# Patient Record
Sex: MALE | Race: WHITE | NOT HISPANIC OR LATINO
[De-identification: names, ages, dates, MRNs, and addresses within clinical notes are randomized per-mention and may not be internally consistent; named-entity substitution may affect disease eponyms.]

---

## 2017-04-28 PROBLEM — Z00.00 ENCOUNTER FOR PREVENTIVE HEALTH EXAMINATION: Status: ACTIVE | Noted: 2017-04-28

## 2017-05-18 ENCOUNTER — APPOINTMENT (OUTPATIENT)
Dept: PULMONOLOGY | Facility: CLINIC | Age: 82
End: 2017-05-18

## 2017-05-18 VITALS
WEIGHT: 176 LBS | HEART RATE: 79 BPM | BODY MASS INDEX: 26.67 KG/M2 | DIASTOLIC BLOOD PRESSURE: 72 MMHG | OXYGEN SATURATION: 92 % | HEIGHT: 68 IN | SYSTOLIC BLOOD PRESSURE: 132 MMHG

## 2017-05-18 RX ORDER — OLMESARTAN MEDOXOMIL 20 MG/1
20 TABLET, FILM COATED ORAL
Refills: 0 | Status: ACTIVE | COMMUNITY

## 2017-05-18 RX ORDER — METOPROLOL SUCCINATE 50 MG/1
50 TABLET, EXTENDED RELEASE ORAL
Refills: 0 | Status: ACTIVE | COMMUNITY

## 2017-05-18 RX ORDER — FOLIC ACID 1 MG/1
1 TABLET ORAL
Refills: 0 | Status: ACTIVE | COMMUNITY

## 2017-05-18 RX ORDER — AMLODIPINE BESYLATE 10 MG/1
10 TABLET ORAL
Refills: 0 | Status: ACTIVE | COMMUNITY

## 2017-05-18 RX ORDER — GUAIFENESIN 600 MG/1
600 TABLET, EXTENDED RELEASE ORAL
Refills: 0 | Status: ACTIVE | COMMUNITY

## 2017-07-19 ENCOUNTER — APPOINTMENT (OUTPATIENT)
Dept: PULMONOLOGY | Facility: CLINIC | Age: 82
End: 2017-07-19

## 2017-07-19 VITALS
HEART RATE: 75 BPM | DIASTOLIC BLOOD PRESSURE: 60 MMHG | OXYGEN SATURATION: 92 % | BODY MASS INDEX: 25.09 KG/M2 | SYSTOLIC BLOOD PRESSURE: 120 MMHG | WEIGHT: 165 LBS

## 2017-07-19 DIAGNOSIS — J44.9 CHRONIC OBSTRUCTIVE PULMONARY DISEASE, UNSPECIFIED: ICD-10-CM

## 2017-07-19 DIAGNOSIS — R06.09 OTHER FORMS OF DYSPNEA: ICD-10-CM

## 2017-07-26 RX ORDER — FLUTICASONE FUROATE AND VILANTEROL TRIFENATATE 200; 25 UG/1; UG/1
200-25 POWDER RESPIRATORY (INHALATION) DAILY
Qty: 3 | Refills: 1 | Status: ACTIVE | COMMUNITY
Start: 2017-07-26 | End: 1900-01-01

## 2017-07-26 RX ORDER — FLUTICASONE PROPIONATE AND SALMETEROL 50; 500 UG/1; UG/1
500-50 POWDER RESPIRATORY (INHALATION) TWICE DAILY
Qty: 3 | Refills: 1 | Status: DISCONTINUED | COMMUNITY
Start: 2017-07-19 | End: 2017-07-26

## 2017-07-28 ENCOUNTER — APPOINTMENT (OUTPATIENT)
Dept: THORACIC SURGERY | Facility: CLINIC | Age: 82
End: 2017-07-28
Payer: MEDICARE

## 2017-07-28 PROCEDURE — 99205 OFFICE O/P NEW HI 60 MIN: CPT

## 2017-08-01 ENCOUNTER — OUTPATIENT (OUTPATIENT)
Dept: OUTPATIENT SERVICES | Facility: HOSPITAL | Age: 82
LOS: 1 days | End: 2017-08-01
Payer: MEDICARE

## 2017-08-01 VITALS
HEIGHT: 71 IN | HEART RATE: 80 BPM | DIASTOLIC BLOOD PRESSURE: 70 MMHG | SYSTOLIC BLOOD PRESSURE: 120 MMHG | WEIGHT: 160.94 LBS | RESPIRATION RATE: 16 BRPM | TEMPERATURE: 97 F

## 2017-08-01 DIAGNOSIS — I10 ESSENTIAL (PRIMARY) HYPERTENSION: ICD-10-CM

## 2017-08-01 DIAGNOSIS — R91.8 OTHER NONSPECIFIC ABNORMAL FINDING OF LUNG FIELD: ICD-10-CM

## 2017-08-01 DIAGNOSIS — Z01.818 ENCOUNTER FOR OTHER PREPROCEDURAL EXAMINATION: ICD-10-CM

## 2017-08-01 DIAGNOSIS — R22.2 LOCALIZED SWELLING, MASS AND LUMP, TRUNK: ICD-10-CM

## 2017-08-01 LAB
ANION GAP SERPL CALC-SCNC: 13 MMOL/L — SIGNIFICANT CHANGE UP (ref 5–17)
APTT BLD: 30 SEC — SIGNIFICANT CHANGE UP (ref 27.5–37.4)
BASOPHILS # BLD AUTO: 0 K/UL — SIGNIFICANT CHANGE UP (ref 0–0.2)
BASOPHILS NFR BLD AUTO: 0.3 % — SIGNIFICANT CHANGE UP (ref 0–2)
BLD GP AB SCN SERPL QL: SIGNIFICANT CHANGE UP
BUN SERPL-MCNC: 22 MG/DL — HIGH (ref 8–20)
CALCIUM SERPL-MCNC: 9.3 MG/DL — SIGNIFICANT CHANGE UP (ref 8.6–10.2)
CHLORIDE SERPL-SCNC: 97 MMOL/L — LOW (ref 98–107)
CO2 SERPL-SCNC: 23 MMOL/L — SIGNIFICANT CHANGE UP (ref 22–29)
CREAT SERPL-MCNC: 1.46 MG/DL — HIGH (ref 0.5–1.3)
EOSINOPHIL # BLD AUTO: 0 K/UL — SIGNIFICANT CHANGE UP (ref 0–0.5)
EOSINOPHIL NFR BLD AUTO: 0.2 % — SIGNIFICANT CHANGE UP (ref 0–5)
GLUCOSE SERPL-MCNC: 109 MG/DL — SIGNIFICANT CHANGE UP (ref 70–115)
HCT VFR BLD CALC: 36.8 % — LOW (ref 42–52)
HGB BLD-MCNC: 11.9 G/DL — LOW (ref 14–18)
INR BLD: 1.16 RATIO — SIGNIFICANT CHANGE UP (ref 0.88–1.16)
LYMPHOCYTES # BLD AUTO: 1.3 K/UL — SIGNIFICANT CHANGE UP (ref 1–4.8)
LYMPHOCYTES # BLD AUTO: 12.3 % — LOW (ref 20–55)
MCHC RBC-ENTMCNC: 25.9 PG — LOW (ref 27–31)
MCHC RBC-ENTMCNC: 32.3 G/DL — SIGNIFICANT CHANGE UP (ref 32–36)
MCV RBC AUTO: 80.2 FL — SIGNIFICANT CHANGE UP (ref 80–94)
MONOCYTES # BLD AUTO: 0.9 K/UL — HIGH (ref 0–0.8)
MONOCYTES NFR BLD AUTO: 8.5 % — SIGNIFICANT CHANGE UP (ref 3–10)
NEUTROPHILS # BLD AUTO: 8.4 K/UL — HIGH (ref 1.8–8)
NEUTROPHILS NFR BLD AUTO: 78.5 % — HIGH (ref 37–73)
PLATELET # BLD AUTO: 349 K/UL — SIGNIFICANT CHANGE UP (ref 150–400)
POTASSIUM SERPL-MCNC: 4.9 MMOL/L — SIGNIFICANT CHANGE UP (ref 3.5–5.3)
POTASSIUM SERPL-SCNC: 4.9 MMOL/L — SIGNIFICANT CHANGE UP (ref 3.5–5.3)
PROTHROM AB SERPL-ACNC: 12.8 SEC — HIGH (ref 9.8–12.7)
RBC # BLD: 4.59 M/UL — LOW (ref 4.6–6.2)
RBC # FLD: 14.8 % — SIGNIFICANT CHANGE UP (ref 11–15.6)
SODIUM SERPL-SCNC: 133 MMOL/L — LOW (ref 135–145)
TYPE + AB SCN PNL BLD: SIGNIFICANT CHANGE UP
WBC # BLD: 10.7 K/UL — SIGNIFICANT CHANGE UP (ref 4.8–10.8)
WBC # FLD AUTO: 10.7 K/UL — SIGNIFICANT CHANGE UP (ref 4.8–10.8)

## 2017-08-01 PROCEDURE — 36415 COLL VENOUS BLD VENIPUNCTURE: CPT

## 2017-08-01 PROCEDURE — 93010 ELECTROCARDIOGRAM REPORT: CPT

## 2017-08-01 PROCEDURE — G0463: CPT

## 2017-08-01 PROCEDURE — 93005 ELECTROCARDIOGRAM TRACING: CPT

## 2017-08-01 PROCEDURE — 86850 RBC ANTIBODY SCREEN: CPT

## 2017-08-01 PROCEDURE — 86900 BLOOD TYPING SEROLOGIC ABO: CPT

## 2017-08-01 PROCEDURE — 86901 BLOOD TYPING SEROLOGIC RH(D): CPT

## 2017-08-01 PROCEDURE — 85027 COMPLETE CBC AUTOMATED: CPT

## 2017-08-01 PROCEDURE — 85730 THROMBOPLASTIN TIME PARTIAL: CPT

## 2017-08-01 PROCEDURE — 85610 PROTHROMBIN TIME: CPT

## 2017-08-01 PROCEDURE — 80048 BASIC METABOLIC PNL TOTAL CA: CPT

## 2017-08-01 NOTE — H&P PST ADULT - NSANTHOSAYNRD_GEN_A_CORE
No. FELIPE screening performed.  STOP BANG Legend: 0-2 = LOW Risk; 3-4 = INTERMEDIATE Risk; 5-8 = HIGH Risk

## 2017-08-01 NOTE — H&P PST ADULT - HISTORY OF PRESENT ILLNESS
83 year old male former smoker 83 year old male former smoker with PMHx HTN present to [facility] for flexible bronchoscopy, endobronchial ultrasound. Patient state he was have SOB and was sent for a chest x-ray and then a PET scan. Patient does not recall the results of the test.

## 2017-08-01 NOTE — ASU PATIENT PROFILE, ADULT - ABILITY TO HEAR (WITH HEARING AID OR HEARING APPLIANCE IF NORMALLY USED):
Mildly to Moderately Impaired: difficulty hearing in some environments or speaker may need to increase volume or speak distinctly/hearing aid but do not use them

## 2017-08-02 ENCOUNTER — APPOINTMENT (OUTPATIENT)
Dept: CARDIOLOGY | Facility: CLINIC | Age: 82
End: 2017-08-02
Payer: MEDICARE

## 2017-08-02 VITALS
HEART RATE: 64 BPM | WEIGHT: 163 LBS | DIASTOLIC BLOOD PRESSURE: 60 MMHG | BODY MASS INDEX: 24.78 KG/M2 | SYSTOLIC BLOOD PRESSURE: 112 MMHG | OXYGEN SATURATION: 95 %

## 2017-08-02 DIAGNOSIS — Z01.810 ENCOUNTER FOR PREPROCEDURAL CARDIOVASCULAR EXAMINATION: ICD-10-CM

## 2017-08-02 PROCEDURE — 93000 ELECTROCARDIOGRAM COMPLETE: CPT

## 2017-08-02 PROCEDURE — 99203 OFFICE O/P NEW LOW 30 MIN: CPT | Mod: 25

## 2017-08-03 PROBLEM — Z01.810 PREOP CARDIOVASCULAR EXAM: Status: ACTIVE | Noted: 2017-08-03

## 2017-08-08 ENCOUNTER — RESULT REVIEW (OUTPATIENT)
Age: 82
End: 2017-08-08

## 2017-08-08 ENCOUNTER — OUTPATIENT (OUTPATIENT)
Dept: OUTPATIENT SERVICES | Facility: HOSPITAL | Age: 82
LOS: 1 days | End: 2017-08-08
Payer: MEDICARE

## 2017-08-08 ENCOUNTER — TRANSCRIPTION ENCOUNTER (OUTPATIENT)
Age: 82
End: 2017-08-08

## 2017-08-08 ENCOUNTER — APPOINTMENT (OUTPATIENT)
Dept: THORACIC SURGERY | Facility: HOSPITAL | Age: 82
End: 2017-08-08

## 2017-08-08 VITALS
OXYGEN SATURATION: 95 % | TEMPERATURE: 98 F | DIASTOLIC BLOOD PRESSURE: 51 MMHG | RESPIRATION RATE: 20 BRPM | HEART RATE: 58 BPM | SYSTOLIC BLOOD PRESSURE: 101 MMHG

## 2017-08-08 VITALS
OXYGEN SATURATION: 96 % | WEIGHT: 160.94 LBS | TEMPERATURE: 97 F | HEIGHT: 71 IN | SYSTOLIC BLOOD PRESSURE: 112 MMHG | HEART RATE: 79 BPM | RESPIRATION RATE: 18 BRPM | DIASTOLIC BLOOD PRESSURE: 67 MMHG

## 2017-08-08 DIAGNOSIS — R22.2 LOCALIZED SWELLING, MASS AND LUMP, TRUNK: ICD-10-CM

## 2017-08-08 PROCEDURE — 31653 BRONCH EBUS SAMPLNG 3/> NODE: CPT

## 2017-08-08 PROCEDURE — 31624 DX BRONCHOSCOPE/LAVAGE: CPT | Mod: LT

## 2017-08-08 PROCEDURE — 88173 CYTOPATH EVAL FNA REPORT: CPT | Mod: 26

## 2017-08-08 PROCEDURE — 31624 DX BRONCHOSCOPE/LAVAGE: CPT

## 2017-08-08 PROCEDURE — 88172 CYTP DX EVAL FNA 1ST EA SITE: CPT | Mod: 26

## 2017-08-08 PROCEDURE — 31625 BRONCHOSCOPY W/BIOPSY(S): CPT

## 2017-08-08 PROCEDURE — 31625 BRONCHOSCOPY W/BIOPSY(S): CPT | Mod: LT

## 2017-08-08 PROCEDURE — 88305 TISSUE EXAM BY PATHOLOGIST: CPT | Mod: 26

## 2017-08-08 PROCEDURE — 31623 DX BRONCHOSCOPE/BRUSH: CPT

## 2017-08-08 PROCEDURE — 88172 CYTP DX EVAL FNA 1ST EA SITE: CPT

## 2017-08-08 PROCEDURE — 88305 TISSUE EXAM BY PATHOLOGIST: CPT

## 2017-08-08 PROCEDURE — 88173 CYTOPATH EVAL FNA REPORT: CPT

## 2017-08-08 PROCEDURE — 31652 BRONCH EBUS SAMPLNG 1/2 NODE: CPT

## 2017-08-08 RX ORDER — FENTANYL CITRATE 50 UG/ML
25 INJECTION INTRAVENOUS
Qty: 0 | Refills: 0 | Status: DISCONTINUED | OUTPATIENT
Start: 2017-08-08 | End: 2017-08-08

## 2017-08-08 RX ORDER — INDOMETHACIN 50 MG/1
50 CAPSULE ORAL
Qty: 100 | Refills: 0 | Status: DISCONTINUED | COMMUNITY
Start: 2017-04-23

## 2017-08-08 RX ORDER — SODIUM CHLORIDE 9 MG/ML
1000 INJECTION, SOLUTION INTRAVENOUS
Qty: 0 | Refills: 0 | Status: DISCONTINUED | OUTPATIENT
Start: 2017-08-08 | End: 2017-08-08

## 2017-08-08 RX ORDER — ONDANSETRON 8 MG/1
4 TABLET, FILM COATED ORAL ONCE
Qty: 0 | Refills: 0 | Status: DISCONTINUED | OUTPATIENT
Start: 2017-08-08 | End: 2017-08-08

## 2017-08-08 RX ORDER — FENTANYL CITRATE 50 UG/ML
50 INJECTION INTRAVENOUS
Qty: 0 | Refills: 0 | Status: DISCONTINUED | OUTPATIENT
Start: 2017-08-08 | End: 2017-08-08

## 2017-08-08 NOTE — ASU DISCHARGE PLAN (ADULT/PEDIATRIC). - MEDICATION SUMMARY - MEDICATIONS TO TAKE
I will START or STAY ON the medications listed below when I get home from the hospital:    Benicar 20 mg oral tablet  -- 1 tab(s) by mouth once a day  -- Indication: For Localized swelling due to mass or lump of torso    metoprolol succinate 50 mg oral tablet, extended release  -- 1 tab(s) by mouth once a day  -- Indication: For Localized swelling due to mass or lump of torso    amLODIPine 10 mg oral tablet  -- 1 tab(s) by mouth once a day  -- Indication: For Localized swelling due to mass or lump of torso    folic acid 1 mg oral tablet  -- 1 tab(s) by mouth once a day  -- Indication: For Localized swelling due to mass or lump of torso

## 2017-08-08 NOTE — ASU DISCHARGE PLAN (ADULT/PEDIATRIC). - BATHING
no change Complex Repair And W Plasty Text: The defect edges were debeveled with a #15 scalpel blade.  The primary defect was closed partially with a complex linear closure.  Given the location of the remaining defect, shape of the defect and the proximity to free margins a W plasty was deemed most appropriate for complete closure of the defect.  Using a sterile surgical marker, an appropriate advancement flap was drawn incorporating the defect and placing the expected incisions within the relaxed skin tension lines where possible.    The area thus outlined was incised deep to adipose tissue with a #15 scalpel blade.  The skin margins were undermined to an appropriate distance in all directions utilizing iris scissors.

## 2017-08-10 LAB
CYTOLOGY FNA REPORT: SIGNIFICANT CHANGE UP
SURGICAL PATHOLOGY FINAL REPORT - CH: SIGNIFICANT CHANGE UP

## 2017-08-29 ENCOUNTER — APPOINTMENT (OUTPATIENT)
Dept: PULMONOLOGY | Facility: CLINIC | Age: 82
End: 2017-08-29

## 2017-08-31 ENCOUNTER — APPOINTMENT (OUTPATIENT)
Dept: THORACIC SURGERY | Facility: CLINIC | Age: 82
End: 2017-08-31
Payer: MEDICARE

## 2017-08-31 VITALS
HEART RATE: 84 BPM | WEIGHT: 160 LBS | RESPIRATION RATE: 16 BRPM | BODY MASS INDEX: 24.25 KG/M2 | HEIGHT: 68 IN | DIASTOLIC BLOOD PRESSURE: 55 MMHG | SYSTOLIC BLOOD PRESSURE: 97 MMHG | OXYGEN SATURATION: 92 %

## 2017-08-31 PROCEDURE — 99214 OFFICE O/P EST MOD 30 MIN: CPT

## 2017-09-07 ENCOUNTER — APPOINTMENT (OUTPATIENT)
Dept: RADIATION ONCOLOGY | Facility: CLINIC | Age: 82
End: 2017-09-07
Payer: MEDICARE

## 2017-09-07 ENCOUNTER — OUTPATIENT (OUTPATIENT)
Dept: OUTPATIENT SERVICES | Facility: HOSPITAL | Age: 82
LOS: 1 days | Discharge: HOME | End: 2017-09-07

## 2017-09-07 VITALS
OXYGEN SATURATION: 92 % | BODY MASS INDEX: 23.82 KG/M2 | TEMPERATURE: 97.8 F | HEIGHT: 68 IN | RESPIRATION RATE: 16 BRPM | WEIGHT: 157.2 LBS | HEART RATE: 82 BPM | SYSTOLIC BLOOD PRESSURE: 117 MMHG | DIASTOLIC BLOOD PRESSURE: 68 MMHG

## 2017-09-07 DIAGNOSIS — Z86.73 PERSONAL HISTORY OF TRANSIENT ISCHEMIC ATTACK (TIA), AND CEREBRAL INFARCTION W/OUT RESIDUAL DEFICITS: ICD-10-CM

## 2017-09-07 PROCEDURE — 99204 OFFICE O/P NEW MOD 45 MIN: CPT | Mod: 25

## 2017-09-07 PROCEDURE — 77263 THER RADIOLOGY TX PLNG CPLX: CPT

## 2017-09-11 ENCOUNTER — OUTPATIENT (OUTPATIENT)
Dept: OUTPATIENT SERVICES | Facility: HOSPITAL | Age: 82
LOS: 1 days | Discharge: HOME | End: 2017-09-11
Payer: MEDICARE

## 2017-09-11 DIAGNOSIS — C34.90 MALIGNANT NEOPLASM OF UNSPECIFIED PART OF UNSPECIFIED BRONCHUS OR LUNG: ICD-10-CM

## 2017-09-12 ENCOUNTER — RESULT REVIEW (OUTPATIENT)
Age: 82
End: 2017-09-12

## 2017-09-12 ENCOUNTER — LABORATORY RESULT (OUTPATIENT)
Age: 82
End: 2017-09-12

## 2017-09-12 ENCOUNTER — APPOINTMENT (OUTPATIENT)
Dept: HEMATOLOGY ONCOLOGY | Facility: CLINIC | Age: 82
End: 2017-09-12
Payer: MEDICARE

## 2017-09-12 VITALS
BODY MASS INDEX: 22.69 KG/M2 | OXYGEN SATURATION: 94 % | RESPIRATION RATE: 16 BRPM | TEMPERATURE: 97.6 F | WEIGHT: 153.22 LBS | HEART RATE: 70 BPM | DIASTOLIC BLOOD PRESSURE: 57 MMHG | SYSTOLIC BLOOD PRESSURE: 95 MMHG | HEIGHT: 69.09 IN

## 2017-09-12 DIAGNOSIS — Z63.4 DISAPPEARANCE AND DEATH OF FAMILY MEMBER: ICD-10-CM

## 2017-09-12 DIAGNOSIS — Z60.2 PROBLEMS RELATED TO LIVING ALONE: ICD-10-CM

## 2017-09-12 DIAGNOSIS — Z78.9 OTHER SPECIFIED HEALTH STATUS: ICD-10-CM

## 2017-09-12 DIAGNOSIS — Z87.891 PERSONAL HISTORY OF NICOTINE DEPENDENCE: ICD-10-CM

## 2017-09-12 DIAGNOSIS — I10 ESSENTIAL (PRIMARY) HYPERTENSION: ICD-10-CM

## 2017-09-12 DIAGNOSIS — D64.89 OTHER SPECIFIED ANEMIAS: ICD-10-CM

## 2017-09-12 LAB
BASOPHILS # BLD AUTO: 0.1 K/UL — SIGNIFICANT CHANGE UP (ref 0–0.2)
BASOPHILS NFR BLD AUTO: 0.8 % — SIGNIFICANT CHANGE UP (ref 0–2)
EOSINOPHIL # BLD AUTO: 0.2 K/UL — SIGNIFICANT CHANGE UP (ref 0–0.5)
EOSINOPHIL NFR BLD AUTO: 1.9 % — SIGNIFICANT CHANGE UP (ref 0–6)
HCT VFR BLD CALC: 28.9 % — LOW (ref 39–50)
HGB BLD-MCNC: 9.2 G/DL — LOW (ref 13–17)
LYMPHOCYTES # BLD AUTO: 1.5 K/UL — SIGNIFICANT CHANGE UP (ref 1–3.3)
LYMPHOCYTES # BLD AUTO: 18.1 % — SIGNIFICANT CHANGE UP (ref 13–44)
MCHC RBC-ENTMCNC: 24.8 PG — LOW (ref 27–34)
MCHC RBC-ENTMCNC: 31.9 GM/DL — LOW (ref 32–36)
MCV RBC AUTO: 77.9 FL — LOW (ref 80–100)
MONOCYTES # BLD AUTO: 0.6 K/UL — SIGNIFICANT CHANGE UP (ref 0–0.9)
MONOCYTES NFR BLD AUTO: 7.8 % — SIGNIFICANT CHANGE UP (ref 2–14)
NEUTROPHILS # BLD AUTO: 5.8 K/UL — SIGNIFICANT CHANGE UP (ref 1.8–7.4)
NEUTROPHILS NFR BLD AUTO: 71.5 % — SIGNIFICANT CHANGE UP (ref 43–77)
PLATELET # BLD AUTO: 456 K/UL — HIGH (ref 150–400)
RBC # BLD: 3.71 M/UL — LOW (ref 4.2–5.8)
RBC # FLD: 15.2 % — HIGH (ref 10.3–14.5)
WBC # BLD: 8.1 K/UL — SIGNIFICANT CHANGE UP (ref 3.8–10.5)
WBC # FLD AUTO: 8.1 K/UL — SIGNIFICANT CHANGE UP (ref 3.8–10.5)

## 2017-09-12 PROCEDURE — 99205 OFFICE O/P NEW HI 60 MIN: CPT

## 2017-09-12 SDOH — SOCIAL STABILITY - SOCIAL INSECURITY: DISSAPEARANCE AND DEATH OF FAMILY MEMBER: Z63.4

## 2017-09-12 SDOH — SOCIAL STABILITY - SOCIAL INSECURITY: PROBLEMS RELATED TO LIVING ALONE: Z60.2

## 2017-09-13 ENCOUNTER — APPOINTMENT (OUTPATIENT)
Dept: RADIATION ONCOLOGY | Facility: CLINIC | Age: 82
End: 2017-09-13
Payer: MEDICARE

## 2017-09-13 ENCOUNTER — FORM ENCOUNTER (OUTPATIENT)
Age: 82
End: 2017-09-13

## 2017-09-13 DIAGNOSIS — R04.2 HEMOPTYSIS: ICD-10-CM

## 2017-09-13 DIAGNOSIS — C34.92 MALIGNANT NEOPLASM OF UNSPECIFIED PART OF LEFT BRONCHUS OR LUNG: ICD-10-CM

## 2017-09-13 PROCEDURE — 77334 RADIATION TREATMENT AID(S): CPT | Mod: 26

## 2017-09-13 PROCEDURE — 99024 POSTOP FOLLOW-UP VISIT: CPT

## 2017-09-13 PROCEDURE — 77307 TELETHX ISODOSE PLAN CPLX: CPT | Mod: 26

## 2017-09-13 PROCEDURE — 77290 THER RAD SIMULAJ FIELD CPLX: CPT | Mod: 26

## 2017-09-14 ENCOUNTER — RESULT REVIEW (OUTPATIENT)
Age: 82
End: 2017-09-14

## 2017-09-14 ENCOUNTER — OUTPATIENT (OUTPATIENT)
Dept: OUTPATIENT SERVICES | Facility: HOSPITAL | Age: 82
LOS: 1 days | End: 2017-09-14

## 2017-09-14 ENCOUNTER — APPOINTMENT (OUTPATIENT)
Dept: NUCLEAR MEDICINE | Facility: CLINIC | Age: 82
End: 2017-09-14
Payer: MEDICARE

## 2017-09-14 ENCOUNTER — APPOINTMENT (OUTPATIENT)
Dept: MRI IMAGING | Facility: CLINIC | Age: 82
End: 2017-09-14
Payer: MEDICARE

## 2017-09-14 DIAGNOSIS — Z00.8 ENCOUNTER FOR OTHER GENERAL EXAMINATION: ICD-10-CM

## 2017-09-14 LAB
ALBUMIN SERPL ELPH-MCNC: 3.2 G/DL
ALP BLD-CCNC: 90 U/L
ALT SERPL-CCNC: 9 U/L
ANION GAP SERPL CALC-SCNC: 16 MMOL/L
AST SERPL-CCNC: 16 U/L
BILIRUB SERPL-MCNC: 0.2 MG/DL
BUN SERPL-MCNC: 32 MG/DL
CALCIUM SERPL-MCNC: 8.8 MG/DL
CHLORIDE SERPL-SCNC: 98 MMOL/L
CO2 SERPL-SCNC: 25 MMOL/L
CREAT SERPL-MCNC: 1.82 MG/DL
GLUCOSE SERPL-MCNC: 102 MG/DL
HBV CORE IGM SER QL: NONREACTIVE
HBV SURFACE AB SER QL: NONREACTIVE
HBV SURFACE AB SERPL IA-ACNC: <3 MIU/ML
HBV SURFACE AG SER QL: NONREACTIVE
HCV AB SER QL: NONREACTIVE
HCV S/CO RATIO: 0.1 S/CO
MAGNESIUM SERPL-MCNC: 2.5 MG/DL
POTASSIUM SERPL-SCNC: 4.7 MMOL/L
PROT SERPL-MCNC: 8 G/DL
SODIUM SERPL-SCNC: 139 MMOL/L

## 2017-09-14 PROCEDURE — 70553 MRI BRAIN STEM W/O & W/DYE: CPT | Mod: 26

## 2017-09-14 PROCEDURE — 77280 THER RAD SIMULAJ FIELD SMPL: CPT | Mod: 26

## 2017-09-14 PROCEDURE — 78815 PET IMAGE W/CT SKULL-THIGH: CPT | Mod: 26,PI

## 2017-09-14 PROCEDURE — 88341 IMHCHEM/IMCYTCHM EA ADD ANTB: CPT | Mod: 26

## 2017-09-14 PROCEDURE — 77427 RADIATION TX MANAGEMENT X5: CPT

## 2017-09-14 PROCEDURE — 88342 IMHCHEM/IMCYTCHM 1ST ANTB: CPT | Mod: 26

## 2017-09-15 LAB — PATH REPORT ADDENDUM.SYNOPTIC DOC: SIGNIFICANT CHANGE UP

## 2017-09-15 PROCEDURE — 77290 THER RAD SIMULAJ FIELD CPLX: CPT | Mod: 26

## 2017-09-15 PROCEDURE — 77334 RADIATION TREATMENT AID(S): CPT | Mod: 26

## 2017-09-18 VITALS
WEIGHT: 154 LBS | SYSTOLIC BLOOD PRESSURE: 135 MMHG | HEIGHT: 69 IN | RESPIRATION RATE: 16 BRPM | BODY MASS INDEX: 22.81 KG/M2 | TEMPERATURE: 97.4 F | DIASTOLIC BLOOD PRESSURE: 76 MMHG | OXYGEN SATURATION: 94 % | HEART RATE: 73 BPM

## 2017-09-18 PROCEDURE — 77280 THER RAD SIMULAJ FIELD SMPL: CPT | Mod: 26

## 2017-09-18 PROCEDURE — 77334 RADIATION TREATMENT AID(S): CPT | Mod: 26

## 2017-09-18 PROCEDURE — 77307 TELETHX ISODOSE PLAN CPLX: CPT | Mod: 26

## 2017-09-20 PROCEDURE — 77427 RADIATION TX MANAGEMENT X5: CPT

## 2017-09-21 ENCOUNTER — APPOINTMENT (OUTPATIENT)
Dept: PULMONOLOGY | Facility: CLINIC | Age: 82
End: 2017-09-21

## 2017-09-21 LAB — PATH REPORT ADDENDUM.SYNOPTIC DOC: SIGNIFICANT CHANGE UP

## 2017-09-21 PROCEDURE — 77427 RADIATION TX MANAGEMENT X5: CPT

## 2017-09-25 ENCOUNTER — APPOINTMENT (OUTPATIENT)
Dept: HEMATOLOGY ONCOLOGY | Facility: CLINIC | Age: 82
End: 2017-09-25
Payer: MEDICARE

## 2017-09-25 ENCOUNTER — RESULT REVIEW (OUTPATIENT)
Age: 82
End: 2017-09-25

## 2017-09-25 VITALS
SYSTOLIC BLOOD PRESSURE: 122 MMHG | OXYGEN SATURATION: 89 % | BODY MASS INDEX: 22.45 KG/M2 | HEART RATE: 87 BPM | WEIGHT: 151.6 LBS | DIASTOLIC BLOOD PRESSURE: 68 MMHG | TEMPERATURE: 98 F | HEIGHT: 69 IN | RESPIRATION RATE: 16 BRPM

## 2017-09-25 VITALS
TEMPERATURE: 97.6 F | DIASTOLIC BLOOD PRESSURE: 64 MMHG | BODY MASS INDEX: 22.2 KG/M2 | OXYGEN SATURATION: 90 % | HEART RATE: 71 BPM | WEIGHT: 150.33 LBS | SYSTOLIC BLOOD PRESSURE: 122 MMHG

## 2017-09-25 DIAGNOSIS — D50.0 IRON DEFICIENCY ANEMIA SECONDARY TO BLOOD LOSS (CHRONIC): ICD-10-CM

## 2017-09-25 DIAGNOSIS — C34.92 MALIGNANT NEOPLASM OF UNSPECIFIED PART OF LEFT BRONCHUS OR LUNG: ICD-10-CM

## 2017-09-25 LAB
BASOPHILS # BLD AUTO: 0.1 K/UL — SIGNIFICANT CHANGE UP (ref 0–0.2)
BASOPHILS NFR BLD AUTO: 1.1 % — SIGNIFICANT CHANGE UP (ref 0–2)
EOSINOPHIL # BLD AUTO: 0.1 K/UL — SIGNIFICANT CHANGE UP (ref 0–0.5)
EOSINOPHIL NFR BLD AUTO: 2.2 % — SIGNIFICANT CHANGE UP (ref 0–6)
HCT VFR BLD CALC: 30.4 % — LOW (ref 39–50)
HGB BLD-MCNC: 10.3 G/DL — LOW (ref 13–17)
LYMPHOCYTES # BLD AUTO: 0.8 K/UL — LOW (ref 1–3.3)
LYMPHOCYTES # BLD AUTO: 13.6 % — SIGNIFICANT CHANGE UP (ref 13–44)
MCHC RBC-ENTMCNC: 26.2 PG — LOW (ref 27–34)
MCHC RBC-ENTMCNC: 33.8 GM/DL — SIGNIFICANT CHANGE UP (ref 32–36)
MCV RBC AUTO: 77.6 FL — LOW (ref 80–100)
MONOCYTES # BLD AUTO: 0.5 K/UL — SIGNIFICANT CHANGE UP (ref 0–0.9)
MONOCYTES NFR BLD AUTO: 7.8 % — SIGNIFICANT CHANGE UP (ref 2–14)
NEUTROPHILS # BLD AUTO: 4.4 K/UL — SIGNIFICANT CHANGE UP (ref 1.8–7.4)
NEUTROPHILS NFR BLD AUTO: 75.3 % — SIGNIFICANT CHANGE UP (ref 43–77)
PLATELET # BLD AUTO: 479 K/UL — HIGH (ref 150–400)
RBC # BLD: 3.92 M/UL — LOW (ref 4.2–5.8)
RBC # FLD: 16.2 % — HIGH (ref 10.3–14.5)
WBC # BLD: 5.9 K/UL — SIGNIFICANT CHANGE UP (ref 3.8–10.5)
WBC # FLD AUTO: 5.9 K/UL — SIGNIFICANT CHANGE UP (ref 3.8–10.5)

## 2017-09-25 PROCEDURE — 99214 OFFICE O/P EST MOD 30 MIN: CPT

## 2017-09-29 ENCOUNTER — APPOINTMENT (OUTPATIENT)
Dept: INFUSION THERAPY | Facility: CLINIC | Age: 82
End: 2017-09-29

## 2017-09-29 ENCOUNTER — LABORATORY RESULT (OUTPATIENT)
Age: 82
End: 2017-09-29

## 2017-09-29 ENCOUNTER — OTHER (OUTPATIENT)
Age: 82
End: 2017-09-29

## 2017-10-02 DIAGNOSIS — Z51.11 ENCOUNTER FOR ANTINEOPLASTIC CHEMOTHERAPY: ICD-10-CM

## 2017-10-04 ENCOUNTER — OTHER (OUTPATIENT)
Age: 82
End: 2017-10-04

## 2017-10-06 ENCOUNTER — INPATIENT (INPATIENT)
Facility: HOSPITAL | Age: 82
LOS: 5 days | Discharge: NURSING, REHAB OR LONG-TERM CARE | DRG: 54 | End: 2017-10-12
Attending: INTERNAL MEDICINE | Admitting: EMERGENCY MEDICINE
Payer: MEDICARE

## 2017-10-06 VITALS
SYSTOLIC BLOOD PRESSURE: 84 MMHG | HEART RATE: 74 BPM | OXYGEN SATURATION: 98 % | TEMPERATURE: 98 F | RESPIRATION RATE: 18 BRPM | WEIGHT: 149.91 LBS | HEIGHT: 71 IN | DIASTOLIC BLOOD PRESSURE: 52 MMHG

## 2017-10-06 DIAGNOSIS — E86.0 DEHYDRATION: ICD-10-CM

## 2017-10-06 DIAGNOSIS — G93.6 CEREBRAL EDEMA: ICD-10-CM

## 2017-10-06 DIAGNOSIS — C34.90 MALIGNANT NEOPLASM OF UNSPECIFIED PART OF UNSPECIFIED BRONCHUS OR LUNG: ICD-10-CM

## 2017-10-06 LAB
ANION GAP SERPL CALC-SCNC: 15 MMOL/L — SIGNIFICANT CHANGE UP (ref 5–17)
APTT BLD: 28 SEC — SIGNIFICANT CHANGE UP (ref 27.5–37.4)
BUN SERPL-MCNC: 20 MG/DL — SIGNIFICANT CHANGE UP (ref 8–20)
CALCIUM SERPL-MCNC: 8.3 MG/DL — LOW (ref 8.6–10.2)
CHLORIDE SERPL-SCNC: 96 MMOL/L — LOW (ref 98–107)
CO2 SERPL-SCNC: 22 MMOL/L — SIGNIFICANT CHANGE UP (ref 22–29)
CREAT SERPL-MCNC: 1.6 MG/DL — HIGH (ref 0.5–1.3)
GLUCOSE SERPL-MCNC: 135 MG/DL — HIGH (ref 70–115)
HCT VFR BLD CALC: 33 % — LOW (ref 42–52)
HGB BLD-MCNC: 10.2 G/DL — LOW (ref 14–18)
INR BLD: 1.11 RATIO — SIGNIFICANT CHANGE UP (ref 0.88–1.16)
MCHC RBC-ENTMCNC: 24.8 PG — LOW (ref 27–31)
MCHC RBC-ENTMCNC: 30.9 G/DL — LOW (ref 32–36)
MCV RBC AUTO: 80.1 FL — SIGNIFICANT CHANGE UP (ref 80–94)
PLATELET # BLD AUTO: 247 K/UL — SIGNIFICANT CHANGE UP (ref 150–400)
POTASSIUM SERPL-MCNC: 4.4 MMOL/L — SIGNIFICANT CHANGE UP (ref 3.5–5.3)
POTASSIUM SERPL-SCNC: 4.4 MMOL/L — SIGNIFICANT CHANGE UP (ref 3.5–5.3)
PROTHROM AB SERPL-ACNC: 12.2 SEC — SIGNIFICANT CHANGE UP (ref 9.8–12.7)
RBC # BLD: 4.12 M/UL — LOW (ref 4.6–6.2)
RBC # FLD: 17.6 % — HIGH (ref 11–15.6)
SODIUM SERPL-SCNC: 133 MMOL/L — LOW (ref 135–145)
WBC # BLD: 3.4 K/UL — LOW (ref 4.8–10.8)
WBC # FLD AUTO: 3.4 K/UL — LOW (ref 4.8–10.8)

## 2017-10-06 PROCEDURE — 99233 SBSQ HOSP IP/OBS HIGH 50: CPT

## 2017-10-06 PROCEDURE — 70450 CT HEAD/BRAIN W/O DYE: CPT | Mod: 26

## 2017-10-06 PROCEDURE — 99223 1ST HOSP IP/OBS HIGH 75: CPT

## 2017-10-06 PROCEDURE — 99285 EMERGENCY DEPT VISIT HI MDM: CPT

## 2017-10-06 PROCEDURE — 93010 ELECTROCARDIOGRAM REPORT: CPT

## 2017-10-06 RX ORDER — SODIUM CHLORIDE 9 MG/ML
3 INJECTION INTRAMUSCULAR; INTRAVENOUS; SUBCUTANEOUS ONCE
Qty: 0 | Refills: 0 | Status: COMPLETED | OUTPATIENT
Start: 2017-10-06 | End: 2017-10-06

## 2017-10-06 RX ORDER — METOPROLOL TARTRATE 50 MG
25 TABLET ORAL
Qty: 0 | Refills: 0 | Status: DISCONTINUED | OUTPATIENT
Start: 2017-10-06 | End: 2017-10-10

## 2017-10-06 RX ORDER — AMLODIPINE BESYLATE 2.5 MG/1
10 TABLET ORAL DAILY
Qty: 0 | Refills: 0 | Status: DISCONTINUED | OUTPATIENT
Start: 2017-10-06 | End: 2017-10-10

## 2017-10-06 RX ORDER — DEXAMETHASONE 0.5 MG/5ML
10 ELIXIR ORAL ONCE
Qty: 0 | Refills: 0 | Status: COMPLETED | OUTPATIENT
Start: 2017-10-06 | End: 2017-10-06

## 2017-10-06 RX ORDER — PANTOPRAZOLE SODIUM 20 MG/1
40 TABLET, DELAYED RELEASE ORAL
Qty: 0 | Refills: 0 | Status: DISCONTINUED | OUTPATIENT
Start: 2017-10-06 | End: 2017-10-10

## 2017-10-06 RX ORDER — DEXAMETHASONE 0.5 MG/5ML
10 ELIXIR ORAL ONCE
Qty: 0 | Refills: 0 | Status: DISCONTINUED | OUTPATIENT
Start: 2017-10-06 | End: 2017-10-06

## 2017-10-06 RX ORDER — SODIUM CHLORIDE 9 MG/ML
1000 INJECTION INTRAMUSCULAR; INTRAVENOUS; SUBCUTANEOUS
Qty: 0 | Refills: 0 | Status: DISCONTINUED | OUTPATIENT
Start: 2017-10-06 | End: 2017-10-07

## 2017-10-06 RX ORDER — SODIUM CHLORIDE 9 MG/ML
1000 INJECTION INTRAMUSCULAR; INTRAVENOUS; SUBCUTANEOUS ONCE
Qty: 0 | Refills: 0 | Status: COMPLETED | OUTPATIENT
Start: 2017-10-06 | End: 2017-10-06

## 2017-10-06 RX ORDER — DEXAMETHASONE 0.5 MG/5ML
4 ELIXIR ORAL EVERY 6 HOURS
Qty: 0 | Refills: 0 | Status: DISCONTINUED | OUTPATIENT
Start: 2017-10-06 | End: 2017-10-07

## 2017-10-06 RX ADMIN — SODIUM CHLORIDE 3 MILLILITER(S): 9 INJECTION INTRAMUSCULAR; INTRAVENOUS; SUBCUTANEOUS at 17:30

## 2017-10-06 RX ADMIN — SODIUM CHLORIDE 3 MILLILITER(S): 9 INJECTION INTRAMUSCULAR; INTRAVENOUS; SUBCUTANEOUS at 11:58

## 2017-10-06 RX ADMIN — SODIUM CHLORIDE 100 MILLILITER(S): 9 INJECTION INTRAMUSCULAR; INTRAVENOUS; SUBCUTANEOUS at 21:32

## 2017-10-06 RX ADMIN — SODIUM CHLORIDE 1000 MILLILITER(S): 9 INJECTION INTRAMUSCULAR; INTRAVENOUS; SUBCUTANEOUS at 11:58

## 2017-10-06 RX ADMIN — Medication 102 MILLIGRAM(S): at 22:48

## 2017-10-06 RX ADMIN — SODIUM CHLORIDE 100 MILLILITER(S): 9 INJECTION INTRAMUSCULAR; INTRAVENOUS; SUBCUTANEOUS at 13:35

## 2017-10-06 NOTE — ED PROVIDER NOTE - PROGRESS NOTE DETAILS
DISCUSSED WITH DR BECK ONCOLOGY. DESHAUN HEAD CT B/C CHANGE IN MENTATION ASPER FRIEND/FAMILY LIKELY FROM DISEASE. IF NEC ADMIT TO OPTIMIZE CT RESULT OF EDEMA REVIEWED WITH ONCOLOGY DR BECK. THIS IS NEW BUT WOULD EXPLAIN THE CHANGE IN HIS MENTAL STATUS THAT HIS FRIEND IS DESCRIBING.  ADMIT TO HOSPITAL , SHE WILL FOLLOW. SW INTERVENTION MAY BE APPROPRIATE AS PT HAS MARKEDLY DECREASED APPETITE AND  LIVES ALONE

## 2017-10-06 NOTE — ED ADULT NURSE REASSESSMENT NOTE - NS ED NURSE REASSESS COMMENT FT1
pt blood pressure improved post normal saline bolus. pt denies pain. no distress noted. purposeful rounding done.  will continue to monitor.

## 2017-10-06 NOTE — H&P ADULT - NSHPLABSRESULTS_GEN_ALL_CORE
VITALS:  Vital Signs Last 24 Hrs  T(C): 36.7 (06 Oct 2017 11:18), Max: 36.7 (06 Oct 2017 11:18)  T(F): 98 (06 Oct 2017 11:18), Max: 98 (06 Oct 2017 11:18)  HR: 63 (06 Oct 2017 17:11) (62 - 74)  BP: 117/62 (06 Oct 2017 17:11) (84/52 - 124/56)  BP(mean): --  RR: 16 (06 Oct 2017 17:11) (16 - 18)  SpO2: 99% (06 Oct 2017 17:11) (97% - 99%) Daily Height in cm: 180.34 (06 Oct 2017 11:18)    Daily   CAPILLARY BLOOD GLUCOSE        I&O's Summary      LABS:                        10.2   3.4   )-----------( 247      ( 06 Oct 2017 12:05 )             33.0     10-06    133<L>  |  96<L>  |  20.0  ----------------------------<  135<H>  4.4   |  22.0  |  1.60<H>    Ca    8.3<L>      06 Oct 2017 12:05      PT/INR - ( 06 Oct 2017 12:05 )   PT: 12.2 sec;   INR: 1.11 ratio         PTT - ( 06 Oct 2017 12:05 )  PTT:28.0 sec            MEDICATIONS:  dexamethasone  Injectable 10 milliGRAM(s) IV Push once  pantoprazole    Tablet 40 milliGRAM(s) Oral before breakfast  sodium chloride 0.9%. 1000 milliLiter(s) IV Continuous <Continuous>

## 2017-10-06 NOTE — H&P ADULT - NSHPPHYSICALEXAM_GEN_ALL_CORE
PHYSICAL EXAMINATION:  GENERAL: NAD, Alert, confused.  HEENT:  Normocephalic and atraumatic.  Extraocular muscles are intact.  NECK: Supple.  - JVD.  CARDIOVASCULAR: RRR S1, S2.  LUNGS: CTAB, - rales, - wheezing, - rhonchi.  BACK: - CVA tenderness.  ABDOMEN: Soft, - tenderness, - distension, + BS.  EXTREMITIES: - cyanosis, - clubbing, - edema.  NEUROLOGIC: strength is symmetric, sensation intact, speech fluent.  PSYCHIATRIC: Calm.  - agitation.    SKIN: - rashes, - lesions.

## 2017-10-06 NOTE — H&P ADULT - ASSESSMENT
83y Male PMH Metastatic Lung cancer (poorly differentiated adenocarcinoma) with brain metastasis on chemotherapy, HTN, CKD Stage III (Cr ~1.4) referred to ED for metabolic encephalopathy.    > Metabolic encephalopathy due to brain metastasis - decadron per hematology.  Supportive care.  Palliative care evaluation.  > HTN - Monitor BP.  Continue oral antihypertensives.  > CKD Stage III - appears to be stable.  Monitor BMP.  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  Monitor BMP.  Liberalize diet.  > DVT Prophylaxis - Lower extremity intermittent compression devices.

## 2017-10-06 NOTE — PROGRESS NOTE ADULT - SUBJECTIVE AND OBJECTIVE BOX
REASON FOR CONSULTATION: Lung cancer, confusion    HPI:  83y Male PMH Metastatic Lung cancer (poorly differentiated adenocarcinoma) with brain metastasis on immunotherapy with Keytruda, HTN, CKD Stage III (Cr ~1.4) referred to ED due to declining condition per pt's friend.  Per chart, pt noted to be increasingly confused, unable to ambulate and experiencing diminished po intake.  Pt denies specific complaints in ED.  Interview limited by confusion.  No additional complaints.     9/14/17 MRI Brain with contrast:  Right parieto-occipital and right cerebellar brain metastases. Atrophy.      FAMILY HISTORY: reviewed and negative.  SOCIAL HISTORY: - alcohol, - IVDA, + smoking.  REVIEW OF SYSTEMS: limited by pt's mental status.   Allergies    No Known Allergies    Intolerances (06 Oct 2017 17:51)      REVIEW OF SYSTEMS:  Constitutional, Eyes, ENT, Cardiovascular, Respiratory, Gastrointestinal, Genitourinary, Musculoskeletal, Integumentary, Neurological, Psychiatric, Endocrine, Heme/Lymph, and Allergic/Immunologic review of systems are otherwise negative except as noted in the HPI.    PAST MEDICAL & SURGICAL HISTORY:  Primary malignant neoplasm of lung metastatic to other site, unspecified laterality: to brain  Hypertension  Retinal detachment  No significant past surgical history      FAMILY HISTORY:  No pertinent family history in first degree relatives      SOCIAL HISTORY:    Allergies    No Known Allergies    Intolerances        MEDICATIONS  (STANDING):  dexamethasone  Injectable 10 milliGRAM(s) IV Push once  pantoprazole    Tablet 40 milliGRAM(s) Oral before breakfast  sodium chloride 0.9%. 1000 milliLiter(s) (100 mL/Hr) IV Continuous <Continuous>    MEDICATIONS  (PRN):      Vital Signs Last 24 Hrs  T(C): 36.7 (06 Oct 2017 11:18), Max: 36.7 (06 Oct 2017 11:18)  T(F): 98 (06 Oct 2017 11:18), Max: 98 (06 Oct 2017 11:18)  HR: 63 (06 Oct 2017 17:11) (62 - 74)  BP: 117/62 (06 Oct 2017 17:11) (84/52 - 124/56)  BP(mean): --  RR: 16 (06 Oct 2017 17:11) (16 - 18)  SpO2: 99% (06 Oct 2017 17:11) (97% - 99%)    PHYSICAL EXAM:    GENERAL: elderly gentleman  HEAD:  Atraumatic, Normocephalic  NECK: Supple, No JVD, Normal thyroid  NERVOUS SYSTEM:  CHEST/LUNG: Clear to auscultation  HEART: Regular rate and rhythm; No murmurs, rubs, or gallops  ABDOMEN: Soft, Nontender, Nondistended; Bowel sounds present  EXTREMITIES:  2+ Peripheral Pulses, No clubbing, cyanosis, or edema  LYMPH: No lymphadenopathy noted  SKIN: No rashes or lesions      LABS:                        10.2   3.4   )-----------( 247      ( 06 Oct 2017 12:05 )             33.0     10-06    133<L>  |  96<L>  |  20.0  ----------------------------<  135<H>  4.4   |  22.0  |  1.60<H>    Ca    8.3<L>      06 Oct 2017 12:05      PT/INR - ( 06 Oct 2017 12:05 )   PT: 12.2 sec;   INR: 1.11 ratio         PTT - ( 06 Oct 2017 12:05 )  PTT:28.0 sec        RADIOLOGY & ADDITIONAL STUDIES:  < from: CT Head No Cont (10.06.17 @ 16:26) >  IMPRESSION:  Nonhemorrhagic vasogenic edema in the right cerebral hemisphere and right   parieto-occipital lobe consistent with the known by brain metastasis from   patient's lung carcinoma as described above no intracranial hemorrhage .   No shift midline structures.    < end of copied text > REASON FOR CONSULTATION: Lung cancer, confusion    HPI:  83y Male PMH Metastatic Lung cancer (poorly differentiated adenocarcinoma) with brain metastasis on immunotherapy with Keytruda, HTN, CKD Stage III (Cr ~1.4) referred to ED due to declining condition per pt's friend.  Per chart, pt noted to be increasingly confused, unable to ambulate and experiencing diminished po intake.  Pt denies specific complaints in ED.  Interview limited by confusion.  No additional complaints.     9/14/17 MRI Brain with contrast:  Right parieto-occipital and right cerebellar brain metastases. Atrophy.      FAMILY HISTORY: reviewed and negative.  SOCIAL HISTORY: - alcohol, - IVDA, + smoking.  REVIEW OF SYSTEMS: limited by pt's mental status.   Allergies    No Known Allergies    Intolerances (06 Oct 2017 17:51)      REVIEW OF SYSTEMS:  Constitutional, Eyes, ENT, Cardiovascular, Respiratory, Gastrointestinal, Genitourinary, Musculoskeletal, Integumentary, Neurological, Psychiatric, Endocrine, Heme/Lymph, and Allergic/Immunologic review of systems are otherwise negative except as noted in the HPI.    PAST MEDICAL & SURGICAL HISTORY:  Primary malignant neoplasm of lung metastatic to other site, unspecified laterality: to brain  Hypertension  Retinal detachment  No significant past surgical history      FAMILY HISTORY:  No pertinent family history in first degree relatives      SOCIAL HISTORY:    Allergies    No Known Allergies    Intolerances        MEDICATIONS  (STANDING):  dexamethasone  Injectable 10 milliGRAM(s) IV Push once  pantoprazole    Tablet 40 milliGRAM(s) Oral before breakfast  sodium chloride 0.9%. 1000 milliLiter(s) (100 mL/Hr) IV Continuous <Continuous>    MEDICATIONS  (PRN):      Vital Signs Last 24 Hrs  T(C): 36.7 (06 Oct 2017 11:18), Max: 36.7 (06 Oct 2017 11:18)  T(F): 98 (06 Oct 2017 11:18), Max: 98 (06 Oct 2017 11:18)  HR: 63 (06 Oct 2017 17:11) (62 - 74)  BP: 117/62 (06 Oct 2017 17:11) (84/52 - 124/56)  BP(mean): --  RR: 16 (06 Oct 2017 17:11) (16 - 18)  SpO2: 99% (06 Oct 2017 17:11) (97% - 99%)    PHYSICAL EXAM:    GENERAL: elderly gentleman  HEAD:  Atraumatic, Normocephalic  NECK: Supple, No JVD, Normal thyroid  NERVOUS SYSTEM: confused, moves all extremities  CHEST/LUNG: Clear to auscultation  HEART: Regular rate and rhythm; No murmurs, rubs, or gallops  ABDOMEN: Soft, Nontender, Nondistended; Bowel sounds present  EXTREMITIES:  2+ Peripheral Pulses, No clubbing, cyanosis, or edema  LYMPH: No lymphadenopathy noted  SKIN: No rashes or lesions      LABS:                        10.2   3.4   )-----------( 247      ( 06 Oct 2017 12:05 )             33.0     10-06    133<L>  |  96<L>  |  20.0  ----------------------------<  135<H>  4.4   |  22.0  |  1.60<H>    Ca    8.3<L>      06 Oct 2017 12:05      PT/INR - ( 06 Oct 2017 12:05 )   PT: 12.2 sec;   INR: 1.11 ratio         PTT - ( 06 Oct 2017 12:05 )  PTT:28.0 sec        RADIOLOGY & ADDITIONAL STUDIES:  < from: CT Head No Cont (10.06.17 @ 16:26) >  IMPRESSION:  Nonhemorrhagic vasogenic edema in the right cerebral hemisphere and right   parieto-occipital lobe consistent with the known by brain metastasis from   patient's lung carcinoma as described above no intracranial hemorrhage .   No shift midline structures.    < end of copied text >

## 2017-10-06 NOTE — ED ADULT NURSE NOTE - PMH
Hypertension    Primary malignant neoplasm of lung metastatic to other site, unspecified laterality  to brain  Retinal detachment

## 2017-10-06 NOTE — ED PROVIDER NOTE - MEDICAL DECISION MAKING DETAILS
PT WITH GENERALIZED WEAKNESS S/P CHEMO. FINISHED RADIATION THERAPY WITHOUT ISSUE. NOW NOT EATING , DRINKING, LOSING WEIGHT. NO ENERGY. DEPRESSION. MOOD SWINGS AS PER WIFE PT WITH GENERALIZED WEAKNESS S/P CHEMO. FINISHED RADIATION THERAPY WITHOUT ISSUE. NOW NOT EATING , DRINKING, LOSING WEIGHT. NO ENERGY. DEPRESSION SUSPECTED. PT EXHIBITS EPISODES OF CONFUSION AS PER FRIEND PT WITH GENERALIZED WEAKNESS S/P CHEMO. FINISHED RADIATION THERAPY WITHOUT ISSUE. NOW NOT EATING , DRINKING, LOSING WEIGHT. NO ENERGY. DEPRESSION SUSPECTED. PT EXHIBITS EPISODES OF CONFUSION AS PER FRIEND. ADMIT TO DR WESTBROOK

## 2017-10-06 NOTE — ED ADULT NURSE NOTE - OBJECTIVE STATEMENT
Pt c/o dehydration has dry mucous membranes, pt alert to person and place. pt reports decrease appetite with increased weakness able to move all extremities. as per patient's son pt has lung cancer eleonora to the brain. pt denies pain, + bowel sounds X4 no tenderness to abdomen noted.

## 2017-10-06 NOTE — ED PROVIDER NOTE - OBJECTIVE STATEMENT
PATIENT WITH HISTORY OF LUNG CANCER WITH METS TO HIS BRAIN AND ADRENAL GLANDS. HE HAS HAD MULTIPLE DOSES OF RADIATION WITH GOOD RESULT AND IS NOW ON CHEMOTHERAPY. WIFE SAYS HE HAD ONE CHEMO LAST WEEK AND SINCE THEN IS EXTREMELY WEAK. PATIENT DOES NOT GET OOB. PATIENT IS NOT EATING. PATIENT IS DEPRESSED. SHE STATES HE JUST STARTED AN ANTIDEPRESSANT BUT IT IS NOT HAVING MUCH EFFECT. PATIENT NEEDS TO BE STRONGER BEFORE HE CAN HAVE HIS NEXT CHEMO. SENT TO ER BY DR BECK ONCOLOGY FOR EVALUATION/OPTIMIZATION. LOSING WEIGHT AS PER WIFE  MED HX:Hypertension    Primary malignant neoplasm of lung metastatic to other site, unspecified laterality  to brain  Retinal detachment PATIENT WITH HISTORY OF LUNG CANCER WITH METS TO HIS BRAIN AND ADRENAL GLANDS. HE HAS HAD MULTIPLE DOSES OF RADIATION WITH GOOD RESULT AND IS NOW ON CHEMOTHERAPY. FRIEND SAYS HE HAD ONE CHEMO LAST WEEK AND SINCE THEN IS EXTREMELY WEAK. PATIENT DOES NOT GET OOB. PATIENT IS NOT EATING. PATIENT NEEDS TO BE STRONGER BEFORE HE CAN HAVE HIS NEXT CHEMO. SENT TO ER BY DR BECK ONCOLOGY FOR EVALUATION/OPTIMIZATION. LOSING WEIGHT AS PER FRIEND  MED HX:Hypertension    Primary malignant neoplasm of lung metastatic to other site, unspecified laterality  to brain  Retinal detachment

## 2017-10-06 NOTE — ED PROVIDER NOTE - CARE PLAN
Principal Discharge DX:	Weakness  Secondary Diagnosis:	Dehydration  Secondary Diagnosis:	Metastatic cancer Principal Discharge DX:	Brain edema  Secondary Diagnosis:	Dehydration  Secondary Diagnosis:	Metastatic cancer

## 2017-10-06 NOTE — PROGRESS NOTE ADULT - ASSESSMENT
83 year old gentleman with lung adenocarcinoma with mets to brain, bone, left adrenal gland.  He's s/p palliative RT to left lung mass for hemoptysis, as well as WBRT.  He's s/p cycle 1 Keytruda. Now admitted with confusion, decreased PO intake.

## 2017-10-06 NOTE — H&P ADULT - HISTORY OF PRESENT ILLNESS
History obtained from chart due to pt's confusion.    [place] - Seamus  83y Male PMH Metastatic Lung cancer (poorly differentiated adenocarcinoma) with brain metastasis on chemotherapy, HTN, CKD Stage III (Cr ~1.4) referred to ED by Dr. Way due to declining condition per pt's friend.  Per chart, pt noted to be increasingly confused, unable to ambulate and experiencing diminished po intake.  Pt denies specific complaints in ED.  Interview limited by confusion.  No additional complaints.     9/14/17 MRI Brain with contrast:  Right parieto-occipital and right cerebellar brain metastases. Atrophy.      FAMILY HISTORY: reviewed and negative.  SOCIAL HISTORY: - alcohol, - IVDA, + smoking.  REVIEW OF SYSTEMS: limited by pt's mental status.   Allergies    No Known Allergies    Intolerances

## 2017-10-06 NOTE — ED ADULT NURSE NOTE - CHPI ED SYMPTOMS NEG
no loss of consciousness/no headache/no dizziness/no back pain/no pain/no nausea/no chills/no vomiting/no fever

## 2017-10-06 NOTE — ED PROVIDER NOTE - GASTROINTESTINAL NEGATIVE STATEMENT, MLM
no abdominal pain, no bloating, no constipation, no diarrhea, no nausea and no vomiting. + DECREASED APPETITE

## 2017-10-07 LAB
ANION GAP SERPL CALC-SCNC: 15 MMOL/L — SIGNIFICANT CHANGE UP (ref 5–17)
BUN SERPL-MCNC: 15 MG/DL — SIGNIFICANT CHANGE UP (ref 8–20)
CALCIUM SERPL-MCNC: 8 MG/DL — LOW (ref 8.6–10.2)
CHLORIDE SERPL-SCNC: 101 MMOL/L — SIGNIFICANT CHANGE UP (ref 98–107)
CO2 SERPL-SCNC: 19 MMOL/L — LOW (ref 22–29)
CREAT SERPL-MCNC: 0.97 MG/DL — SIGNIFICANT CHANGE UP (ref 0.5–1.3)
GLUCOSE SERPL-MCNC: 149 MG/DL — HIGH (ref 70–115)
HCT VFR BLD CALC: 32.9 % — LOW (ref 42–52)
HGB BLD-MCNC: 10.1 G/DL — LOW (ref 14–18)
MCHC RBC-ENTMCNC: 24.2 PG — LOW (ref 27–31)
MCHC RBC-ENTMCNC: 30.7 G/DL — LOW (ref 32–36)
MCV RBC AUTO: 78.7 FL — LOW (ref 80–94)
PLATELET # BLD AUTO: 200 K/UL — SIGNIFICANT CHANGE UP (ref 150–400)
POTASSIUM SERPL-MCNC: 4.7 MMOL/L — SIGNIFICANT CHANGE UP (ref 3.5–5.3)
POTASSIUM SERPL-SCNC: 4.7 MMOL/L — SIGNIFICANT CHANGE UP (ref 3.5–5.3)
RBC # BLD: 4.18 M/UL — LOW (ref 4.6–6.2)
RBC # FLD: 17.4 % — HIGH (ref 11–15.6)
SODIUM SERPL-SCNC: 135 MMOL/L — SIGNIFICANT CHANGE UP (ref 135–145)
WBC # BLD: 2.3 K/UL — LOW (ref 4.8–10.8)
WBC # FLD AUTO: 2.3 K/UL — LOW (ref 4.8–10.8)

## 2017-10-07 PROCEDURE — 99233 SBSQ HOSP IP/OBS HIGH 50: CPT

## 2017-10-07 RX ORDER — ONDANSETRON 8 MG/1
4 TABLET, FILM COATED ORAL ONCE
Qty: 0 | Refills: 0 | Status: COMPLETED | OUTPATIENT
Start: 2017-10-07 | End: 2017-10-08

## 2017-10-07 RX ORDER — DEXAMETHASONE 0.5 MG/5ML
4 ELIXIR ORAL EVERY 8 HOURS
Qty: 0 | Refills: 0 | Status: DISCONTINUED | OUTPATIENT
Start: 2017-10-07 | End: 2017-10-11

## 2017-10-07 RX ADMIN — Medication 4 MILLIGRAM(S): at 06:23

## 2017-10-07 RX ADMIN — AMLODIPINE BESYLATE 10 MILLIGRAM(S): 2.5 TABLET ORAL at 06:23

## 2017-10-07 RX ADMIN — Medication 25 MILLIGRAM(S): at 06:23

## 2017-10-07 RX ADMIN — Medication 4 MILLIGRAM(S): at 17:15

## 2017-10-07 RX ADMIN — Medication 4 MILLIGRAM(S): at 22:15

## 2017-10-07 RX ADMIN — PANTOPRAZOLE SODIUM 40 MILLIGRAM(S): 20 TABLET, DELAYED RELEASE ORAL at 06:23

## 2017-10-07 NOTE — PROGRESS NOTE ADULT - SUBJECTIVE AND OBJECTIVE BOX
Patient: DERRICK GIBBONS [id number] 83y Male                 Internal Medicine Hospitalist Progress Note - Dr. Barber Shields    Chief Complaint: Patient is a 83y old  Male who presents with a chief complaint of Confusion (06 Oct 2017 17:51)    HPI:  History obtained from chart due to pt's confusion.    H[place] - Seamus  83y Male PMH Metastatic Lung cancer (poorly differentiated adenocarcinoma) with brain metastasis on chemotherapy, HTN, CKD Stage III (Cr ~1.4) referred to ED by Dr. Way due to declining condition and confusion.  CT showed vasogenic edema c/w known brain metastasis.  Started on IVF and steroids.      Pt's mental status significantly improved.  Now at baseline, admits to being confused with poor appetite.  Lives alone, no family.  Has assigned his friend Siria Washington as his HCP.  No weakness / numbness / headache.  No additional complaints.    CT Head showed Nonhemorrhagic vasogenic edema in the right cerebral hemisphere and right parieto-occipital lobe consistent with the known by brain metastasis from   patient's lung carcinoma as described above no intracranial hemorrhage . No shift midline structures.  9/14/17 MRI Brain with contrast:  Right parieto-occipital and right cerebellar brain metastases. Atrophy.    ____________________PHYSICAL EXAM:  Vitals reviewed as indicated below  GENERAL:  NAD Alert and Oriented x 3, pleasant  PSYCH: appropriate  HEENT: NCAT  CARDIOVASCULAR:  S1, S2  LUNGS: CTAB  ABDOMEN:  soft, (-) tenderness, (-) distension, (+) bowel sounds, (-) guarding, (-) rebound (-) rigidity  EXTREMITIES:  no cyanosis / clubbing / edema.   NEURO: nonfocal    ____________________    BACKGROUND:  HEALTH ISSUES - PROBLEM Dx:  Primary malignant neoplasm of lung metastatic to other site, unspecified laterality: Primary malignant neoplasm of lung metastatic to other site, unspecified laterality  Dehydration: Dehydration  Brain edema: Brain edema        Allergies    No Known Allergies    Intolerances      PAST MEDICAL & SURGICAL HISTORY:  Primary malignant neoplasm of lung metastatic to other site, unspecified laterality: to brain  Hypertension  Retinal detachment  No significant past surgical history      VITALS:  Vital Signs Last 24 Hrs  T(C): 36.5 (07 Oct 2017 06:32), Max: 37.1 (06 Oct 2017 18:51)  T(F): 97.7 (07 Oct 2017 06:32), Max: 98.7 (06 Oct 2017 18:51)  HR: 73 (07 Oct 2017 06:32) (62 - 74)  BP: 107/67 (07 Oct 2017 06:32) (84/52 - 124/56)  BP(mean): --  RR: 18 (07 Oct 2017 06:32) (16 - 18)  SpO2: 98% (06 Oct 2017 22:00) (93% - 99%) Daily Height in cm: 180.34 (06 Oct 2017 11:18)    Daily   CAPILLARY BLOOD GLUCOSE        I&O's Summary    06 Oct 2017 07:01  -  07 Oct 2017 07:00  --------------------------------------------------------  IN: 1100 mL / OUT: 0 mL / NET: 1100 mL        LABS:                        10.1   2.3   )-----------( 200      ( 07 Oct 2017 07:25 )             32.9     10-07    135  |  101  |  15.0  ----------------------------<  149<H>  4.7   |  19.0<L>  |  0.97    Ca    8.0<L>      07 Oct 2017 07:25      PT/INR - ( 06 Oct 2017 12:05 )   PT: 12.2 sec;   INR: 1.11 ratio         PTT - ( 06 Oct 2017 12:05 )  PTT:28.0 sec            MEDICATIONS:  MEDICATIONS  (STANDING):  amLODIPine   Tablet 10 milliGRAM(s) Oral daily  dexamethasone  Injectable 4 milliGRAM(s) IV Push every 8 hours  metoprolol 25 milliGRAM(s) Oral two times a day  pantoprazole    Tablet 40 milliGRAM(s) Oral before breakfast    MEDICATIONS  (PRN):

## 2017-10-07 NOTE — PROVIDER CONTACT NOTE (MEDICATION) - SITUATION
pt has [place] post vomiting. Called PA to see if he could get something for naseau. Olivia will order delilah

## 2017-10-07 NOTE — PROGRESS NOTE ADULT - ASSESSMENT
83y Male PMH Metastatic Lung cancer (poorly differentiated adenocarcinoma) with brain metastasis on chemotherapy, HTN, CKD Stage III (Cr ~1.4) referred to ED for metabolic encephalopathy.    > Metabolic encephalopathy due to brain metastasis - Responded to Decadron.  Taper to q8h.  Palliative care evaluation.  > SYLVIA / Dehydration ? component of CKD.  - tolerating po.  D/c IVF, monitor BMP.    > HTN - Monitor BP.  Continue oral antihypertensives.  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  Monitor BMP.  Regular diet.   > DVT Prophylaxis - Lower extremity intermittent compression devices.    The Hospitalist Service will assume care of this patient beginning tomorrow.

## 2017-10-08 LAB
ANION GAP SERPL CALC-SCNC: 12 MMOL/L — SIGNIFICANT CHANGE UP (ref 5–17)
BUN SERPL-MCNC: 47 MG/DL — HIGH (ref 8–20)
CALCIUM SERPL-MCNC: 7.9 MG/DL — LOW (ref 8.6–10.2)
CHLORIDE SERPL-SCNC: 100 MMOL/L — SIGNIFICANT CHANGE UP (ref 98–107)
CO2 SERPL-SCNC: 19 MMOL/L — LOW (ref 22–29)
CREAT SERPL-MCNC: 1.16 MG/DL — SIGNIFICANT CHANGE UP (ref 0.5–1.3)
GLUCOSE SERPL-MCNC: 138 MG/DL — HIGH (ref 70–115)
HCT VFR BLD CALC: 27.8 % — LOW (ref 42–52)
HGB BLD-MCNC: 8.7 G/DL — LOW (ref 14–18)
MCHC RBC-ENTMCNC: 24.2 PG — LOW (ref 27–31)
MCHC RBC-ENTMCNC: 31.3 G/DL — LOW (ref 32–36)
MCV RBC AUTO: 77.4 FL — LOW (ref 80–94)
PLATELET # BLD AUTO: 230 K/UL — SIGNIFICANT CHANGE UP (ref 150–400)
POTASSIUM SERPL-MCNC: 5.7 MMOL/L — HIGH (ref 3.5–5.3)
POTASSIUM SERPL-SCNC: 5.7 MMOL/L — HIGH (ref 3.5–5.3)
RBC # BLD: 3.59 M/UL — LOW (ref 4.6–6.2)
RBC # FLD: 17.1 % — HIGH (ref 11–15.6)
SODIUM SERPL-SCNC: 131 MMOL/L — LOW (ref 135–145)
WBC # BLD: 6.5 K/UL — SIGNIFICANT CHANGE UP (ref 4.8–10.8)
WBC # FLD AUTO: 6.5 K/UL — SIGNIFICANT CHANGE UP (ref 4.8–10.8)

## 2017-10-08 PROCEDURE — 99233 SBSQ HOSP IP/OBS HIGH 50: CPT

## 2017-10-08 RX ADMIN — ONDANSETRON 4 MILLIGRAM(S): 8 TABLET, FILM COATED ORAL at 00:20

## 2017-10-08 RX ADMIN — Medication 4 MILLIGRAM(S): at 14:30

## 2017-10-08 RX ADMIN — Medication 25 MILLIGRAM(S): at 06:38

## 2017-10-08 RX ADMIN — Medication 4 MILLIGRAM(S): at 21:58

## 2017-10-08 RX ADMIN — AMLODIPINE BESYLATE 10 MILLIGRAM(S): 2.5 TABLET ORAL at 06:38

## 2017-10-08 RX ADMIN — PANTOPRAZOLE SODIUM 40 MILLIGRAM(S): 20 TABLET, DELAYED RELEASE ORAL at 06:38

## 2017-10-08 RX ADMIN — Medication 4 MILLIGRAM(S): at 06:38

## 2017-10-08 NOTE — PROGRESS NOTE ADULT - ASSESSMENT
83y Male PMH Metastatic Lung cancer (poorly differentiated adenocarcinoma) with brain metastasis on chemotherapy, HTN, CKD Stage III (Cr ~1.4) referred to ED for metabolic encephalopathy.    > Metabolic encephalopathy due to brain metastasis - Responded to Decadron. continue for now  Taper to q8h.  Palliative care evaluation.- Dr Cid to follow   > SYLVIA / Dehydration ? component of CKD.  - tolerating po.  D/c IVF, monitor BMP.    > HTN - Monitor BP.  Continue oral antihypertensives.  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  Monitor BMP.  Regular diet.   > DVT Prophylaxis - Lower extremity intermittent compression devices. 83y Male PMH Metastatic Lung cancer (poorly differentiated adenocarcinoma) with brain metastasis on chemotherapy, HTN, CKD Stage III (Cr ~1.4) referred to ED for metabolic encephalopathy.    > Metabolic encephalopathy due to brain metastasis - Responded to Decadron. continue for now  Taper to q 8h.  Palliative care evaluation.- Dr Cid to follow   > SYLVIA / Dehydration ? component of CKD.  - tolerating po.  D/c IVF, monitor BMP.    > HTN - Monitor BP.  Continue oral antihypertensives.  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  Monitor BMP.  Regular diet.   > DVT Prophylaxis - Lower extremity intermittent compression devices.

## 2017-10-08 NOTE — PROGRESS NOTE ADULT - SUBJECTIVE AND OBJECTIVE BOX
DERRICK GIBBONS Patient is a 83y old  Male who presents with a chief complaint of Confusion (06 Oct 2017 17:51)     HPI:  History obtained from chart due to pt's confusion.    Heme - Seamus  83y Male PMH Metastatic Lung cancer (poorly differentiated adenocarcinoma) with brain metastasis on chemotherapy, HTN, CKD Stage III (Cr ~1.4) referred to ED by Dr. Way due to declining condition per pt's friend.  Per chart, pt noted to be increasingly confused, unable to ambulate and experiencing diminished po intake.  Pt denies specific complaints in ED.  Interview limited by confusion.  No additional complaints.     9/14/17 MRI Brain with contrast:  Right parieto-occipital and right cerebellar brain metastases. Atrophy.      FAMILY HISTORY: reviewed and negative.  SOCIAL HISTORY: - alcohol, - IVDA, + smoking.  REVIEW OF SYSTEMS: limited by pt's mental status.   Allergies    No Known Allergies    Intolerances (06 Oct 2017 17:51)    The patient was seen and evaluated lung cancer with brain mets  The patient is in no acute distress.  Denied any fever chest pain, palpitations, shortness of breath, abdominal pain, fever, dysuria, cough, edema   Complains of some confusion     I&O's Summary    07 Oct 2017 07:01  -  08 Oct 2017 07:00  --------------------------------------------------------  IN: 0 mL / OUT: 700 mL / NET: -700 mL      Allergies    No Known Allergies    Intolerances      HEALTH ISSUES - PROBLEM Dx:  Primary malignant neoplasm of lung metastatic to other site, unspecified laterality: Primary malignant neoplasm of lung metastatic to other site, unspecified laterality  Dehydration: Dehydration  Brain edema: Brain edema        PAST MEDICAL & SURGICAL HISTORY:  Primary malignant neoplasm of lung metastatic to other site, unspecified laterality: to brain  Hypertension  Retinal detachment  No significant past surgical history          Vital Signs Last 24 Hrs  T(C): 36.6 (08 Oct 2017 06:07), Max: 36.6 (08 Oct 2017 06:07)  T(F): 97.9 (08 Oct 2017 06:07), Max: 97.9 (08 Oct 2017 06:07)  HR: 69 (08 Oct 2017 06:07) (69 - 76)  BP: 104/66 (08 Oct 2017 06:07) (98/55 - 104/66)  BP(mean): --  RR: 18 (08 Oct 2017 06:07) (18 - 18)  SpO2: 96% (08 Oct 2017 06:07) (93% - 96%)T(C): 36.6 (10-08-17 @ 06:07), Max: 36.6 (10-08-17 @ 06:07)  HR: 69 (10-08-17 @ 06:07) (69 - 76)  BP: 104/66 (10-08-17 @ 06:07) (98/55 - 104/66)  RR: 18 (10-08-17 @ 06:07) (18 - 18)  SpO2: 96% (10-08-17 @ 06:07) (93% - 96%)  Wt(kg): --    PHYSICAL EXAM:    GENERAL: NAD, ill appearing , elderly , having difficulty finding words , some expressive aphasia but when prompted is able to answer   HEAD:  Atraumatic, Normocephalic  EYES: EOMI, PERRL,  conjunctiva and sclera clear  ENMT:  Moist mucous membranes,  No lesions  NECK: Supple,   NERVOUS SYSTEM:  Alert & Oriented X2,  Moves upper and lower extremities; CNS-II-XII  CHEST/LUNG: Clear to auscultation bilaterally; No rales, rhonchi, wheezing,   HEART: Regular rate and rhythm; No murmurs,   ABDOMEN: Soft, Nontender, Nondistended; Bowel sounds present  EXTREMITIES:  Peripheral Pulses, No  cyanosis, or edema  SKIN: No rashes or lesions  psychiatry- unclear Insight and judgement intact     amLODIPine   Tablet 10 milliGRAM(s) Oral daily  dexamethasone  Injectable 4 milliGRAM(s) IV Push every 8 hours  metoprolol 25 milliGRAM(s) Oral two times a day  pantoprazole    Tablet 40 milliGRAM(s) Oral before breakfast      LABS:                          8.7    6.5   )-----------( 230      ( 08 Oct 2017 08:08 )             27.8     10-08    131<L>  |  100  |  47.0<H>  ----------------------------<  138<H>  5.7<H>   |  19.0<L>  |  1.16    Ca    7.9<L>      08 Oct 2017 08:08                CAPILLARY BLOOD GLUCOSE          RADIOLOGY & ADDITIONAL TESTS:      Consultant notes reviewed    Case discussed with consultant/provider/ family /patient

## 2017-10-09 LAB — OB PNL STL: POSITIVE

## 2017-10-09 PROCEDURE — 99233 SBSQ HOSP IP/OBS HIGH 50: CPT

## 2017-10-09 RX ORDER — SODIUM POLYSTYRENE SULFONATE 4.1 MEQ/G
30 POWDER, FOR SUSPENSION ORAL ONCE
Qty: 0 | Refills: 0 | Status: COMPLETED | OUTPATIENT
Start: 2017-10-09 | End: 2017-10-09

## 2017-10-09 RX ADMIN — PANTOPRAZOLE SODIUM 40 MILLIGRAM(S): 20 TABLET, DELAYED RELEASE ORAL at 06:01

## 2017-10-09 RX ADMIN — SODIUM POLYSTYRENE SULFONATE 30 GRAM(S): 4.1 POWDER, FOR SUSPENSION ORAL at 16:00

## 2017-10-09 RX ADMIN — Medication 4 MILLIGRAM(S): at 05:32

## 2017-10-09 RX ADMIN — Medication 4 MILLIGRAM(S): at 14:24

## 2017-10-09 RX ADMIN — Medication 4 MILLIGRAM(S): at 22:48

## 2017-10-09 NOTE — PROGRESS NOTE ADULT - ASSESSMENT
83y Male PMH Metastatic Lung cancer (poorly differentiated adenocarcinoma) with brain metastasis on chemotherapy, HTN, CKD Stage III (Cr ~1.4) referred to ED for metabolic encephalopathy.    > Metabolic encephalopathy due to brain metastasis - Responding to Decadron. continue for now  Taper to q 8h.  Palliative care evaluation.- Dr Cid to follow called, DNR, DNI MOLST form fileed, HCP-(Beltran singleton) in agreement     > SYLVIA / Dehydration? component of CKD.  -improving kidney function , now tolerating po.  D/c IVF, monitor BMP.      > HTN - Monitor BP.  Continue oral antihypertensives.    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 improving sodium Monitor BMP.  Regular diet.     > hyperkalemia- Kayexalate     >Anemia- check stool occult blood follow up CBC in AM     > DVT Prophylaxis - Lower extremity intermittent compression devices.

## 2017-10-09 NOTE — GOALS OF CARE CONVERSATION - PERSONAL ADVANCE DIRECTIVE - CONVERSATION DETAILS
Mtg with Pt and HCP Sunil Washington.  Pt is awake and confused.  O2 in place no signs of discomfort or pain @ this time.  HCP Sunil verbalized that Pt lives alone in an illegal apt and it is not safe.  Pt has been cooking leaving the stove on and not able to ambulate well Seg[place] has told Sunil she wants Pt to move she is concerned he will burn the house down.  Advanced Directives were reviewed with HCP he verbalized that the Pt would want to be DNR/DNI.  HCP would also like to meet with the oncologist and expain that he does not wish the Pt to receive any further Chemotherapy.  Pt was very ill following the first treatment.  Pt is also aware of the extent of the Lung cancer and metastisis.  He will follow up with DR Cid.  Dr. Bravo notified of outcome of mtgand that a Molst form will be completed.  CCC and social work also notified of mtg.  Palliative will follow as needed.  Pt is Hospice approiate for home but @ this time there is not a safe home.

## 2017-10-09 NOTE — PROGRESS NOTE ADULT - SUBJECTIVE AND OBJECTIVE BOX
DERRICK GIBBONS Patient is a 83y old  Male who presents with a chief complaint of Confusion (06 Oct 2017 17:51)     HPI:  History obtained from chart due to pt's confusion.    Heme - Seamus  83y Male PMH Metastatic Lung cancer (poorly differentiated adenocarcinoma) with brain metastasis on chemotherapy, HTN, CKD Stage III (Cr ~1.4) referred to ED by Dr. Way due to declining condition per pt's friend.  Per chart, pt noted to be increasingly confused, unable to ambulate and experiencing diminished po intake.  Pt denies specific complaints in ED.  Interview limited by confusion.  No additional complaints.     9/14/17 MRI Brain with contrast:  Right parieto-occipital and right cerebellar brain metastases. Atrophy.      FAMILY HISTORY: reviewed and negative.  SOCIAL HISTORY: - alcohol, - IVDA, + smoking.  REVIEW OF SYSTEMS: limited by pt's mental status.   Allergies    No Known Allergies    Intolerances (06 Oct 2017 17:51)    The patient was seen and evaluated   The patient is in no acute distress.  Denied any fever chest pain, palpitations, shortness of breath, abdominal pain, fever, dysuria, cough, edema   Complains of "the whole thing spilled, the food the water on the table"    I&O's Summary    09 Oct 2017 07:01  -  09 Oct 2017 14:56  --------------------------------------------------------  IN: 360 mL / OUT: 2 mL / NET: 358 mL      Allergies    No Known Allergies    Intolerances      HEALTH ISSUES - PROBLEM Dx:  Primary malignant neoplasm of lung metastatic to other site, unspecified laterality: Primary malignant neoplasm of lung metastatic to other site, unspecified laterality  Dehydration: Dehydration  Brain edema: Brain edema        PAST MEDICAL & SURGICAL HISTORY:  Primary malignant neoplasm of lung metastatic to other site, unspecified laterality: to brain  Hypertension  Retinal detachment  No significant past surgical history          Vital Signs Last 24 Hrs  T(C): 36.6 (09 Oct 2017 12:10), Max: 36.6 (09 Oct 2017 12:10)  T(F): 97.8 (09 Oct 2017 12:10), Max: 97.8 (09 Oct 2017 12:10)  HR: 57 (09 Oct 2017 12:10) (55 - 64)  BP: 90/53 (09 Oct 2017 12:10) (89/47 - 104/52)  BP(mean): --  RR: 18 (09 Oct 2017 12:10) (14 - 18)  SpO2: 96% (09 Oct 2017 12:10) (95% - 96%)T(C): 36.6 (10-09-17 @ 12:10), Max: 36.6 (10-09-17 @ 12:10)  HR: 57 (10-09-17 @ 12:10) (55 - 64)  BP: 90/53 (10-09-17 @ 12:10) (89/47 - 104/52)  RR: 18 (10-09-17 @ 12:10) (14 - 18)  SpO2: 96% (10-09-17 @ 12:10) (95% - 96%)  Wt(kg): --    PHYSICAL EXAM:    GENERAL: NAD, elderly cachectic, frail  HEAD:  Atraumatic, Normocephalic  EYES: EOMI, PERRL, conjunctiva and sclera clear  ENMT:  Moist mucous membranes,  No lesions  NECK: Supple,   NERVOUS SYSTEM:  Alert & confused, randomly in bed Moves upper and lower extremities; CNS-II-XII  CHEST/LUNG: Clear to auscultation bilaterally; No rales, rhonchi, wheezing,   HEART: Regular rate and rhythm; No murmurs,   ABDOMEN: Soft, Nontender, Nondistended; Bowel sounds present  EXTREMITIES:  Peripheral Pulses, No  cyanosis, or edema  SKIN: No rashes or lesions  psychiatry- unclear Insight and judgement intact     amLODIPine   Tablet 10 milliGRAM(s) Oral daily  dexamethasone  Injectable 4 milliGRAM(s) IV Push every 8 hours  metoprolol 25 milliGRAM(s) Oral two times a day  pantoprazole    Tablet 40 milliGRAM(s) Oral before breakfast      LABS:                          8.7    6.5   )-----------( 230      ( 08 Oct 2017 08:08 )             27.8     10-08    131<L>  |  100  |  47.0<H>  ----------------------------<  138<H>  5.7<H>   |  19.0<L>  |  1.16    Ca    7.9<L>      08 Oct 2017 08:08                CAPILLARY BLOOD GLUCOSE          RADIOLOGY & ADDITIONAL TESTS:      Consultant notes reviewed    Case discussed with consultant/provider/ family /patient

## 2017-10-10 LAB
ALBUMIN SERPL ELPH-MCNC: 2.7 G/DL — LOW (ref 3.3–5.2)
ALP SERPL-CCNC: 65 U/L — SIGNIFICANT CHANGE UP (ref 40–120)
ALT FLD-CCNC: 24 U/L — SIGNIFICANT CHANGE UP
ANION GAP SERPL CALC-SCNC: 12 MMOL/L — SIGNIFICANT CHANGE UP (ref 5–17)
ANISOCYTOSIS BLD QL: SLIGHT — SIGNIFICANT CHANGE UP
AST SERPL-CCNC: 16 U/L — SIGNIFICANT CHANGE UP
BILIRUB SERPL-MCNC: 0.2 MG/DL — LOW (ref 0.4–2)
BUN SERPL-MCNC: 41 MG/DL — HIGH (ref 8–20)
CALCIUM SERPL-MCNC: 7.8 MG/DL — LOW (ref 8.6–10.2)
CHLORIDE SERPL-SCNC: 102 MMOL/L — SIGNIFICANT CHANGE UP (ref 98–107)
CO2 SERPL-SCNC: 23 MMOL/L — SIGNIFICANT CHANGE UP (ref 22–29)
CREAT SERPL-MCNC: 1.25 MG/DL — SIGNIFICANT CHANGE UP (ref 0.5–1.3)
EOSINOPHIL # BLD AUTO: 0 K/UL — SIGNIFICANT CHANGE UP (ref 0–0.5)
EOSINOPHIL NFR BLD AUTO: 0 % — SIGNIFICANT CHANGE UP (ref 0–5)
FERRITIN SERPL-MCNC: 63 NG/ML — SIGNIFICANT CHANGE UP (ref 30–400)
GLUCOSE SERPL-MCNC: 121 MG/DL — HIGH (ref 70–115)
HAPTOGLOB SERPL-MCNC: 264 MG/DL — HIGH (ref 34–200)
HCT VFR BLD CALC: 24 % — LOW (ref 42–52)
HGB BLD-MCNC: 7.8 G/DL — LOW (ref 14–18)
HYPOCHROMIA BLD QL: SLIGHT — SIGNIFICANT CHANGE UP
IRON SATN MFR SERPL: 10 % — LOW (ref 16–55)
IRON SATN MFR SERPL: 23 UG/DL — LOW (ref 59–158)
LYMPHOCYTES # BLD AUTO: 0.9 K/UL — LOW (ref 1–4.8)
LYMPHOCYTES # BLD AUTO: 16.8 % — LOW (ref 20–55)
MACROCYTES BLD QL: SLIGHT — SIGNIFICANT CHANGE UP
MCHC RBC-ENTMCNC: 24.8 PG — LOW (ref 27–31)
MCHC RBC-ENTMCNC: 32.5 G/DL — SIGNIFICANT CHANGE UP (ref 32–36)
MCV RBC AUTO: 76.2 FL — LOW (ref 80–94)
MONOCYTES # BLD AUTO: 0.2 K/UL — SIGNIFICANT CHANGE UP (ref 0–0.8)
MONOCYTES NFR BLD AUTO: 2.7 % — LOW (ref 3–10)
NEUTROPHILS # BLD AUTO: 4.4 K/UL — SIGNIFICANT CHANGE UP (ref 1.8–8)
NEUTROPHILS NFR BLD AUTO: 80 % — HIGH (ref 37–73)
PLAT MORPH BLD: NORMAL — SIGNIFICANT CHANGE UP
PLATELET # BLD AUTO: 202 K/UL — SIGNIFICANT CHANGE UP (ref 150–400)
POIKILOCYTOSIS BLD QL AUTO: SLIGHT — SIGNIFICANT CHANGE UP
POTASSIUM SERPL-MCNC: 4.4 MMOL/L — SIGNIFICANT CHANGE UP (ref 3.5–5.3)
POTASSIUM SERPL-SCNC: 4.4 MMOL/L — SIGNIFICANT CHANGE UP (ref 3.5–5.3)
PROT SERPL-MCNC: 6.2 G/DL — LOW (ref 6.6–8.7)
RBC # BLD: 3.15 M/UL — LOW (ref 4.6–6.2)
RBC # FLD: 17.6 % — HIGH (ref 11–15.6)
RBC BLD AUTO: ABNORMAL
SODIUM SERPL-SCNC: 137 MMOL/L — SIGNIFICANT CHANGE UP (ref 135–145)
TIBC SERPL-MCNC: 229 UG/DL — SIGNIFICANT CHANGE UP (ref 220–430)
TRANSFERRIN SERPL-MCNC: 160 MG/DL — LOW (ref 180–329)
WBC # BLD: 5.5 K/UL — SIGNIFICANT CHANGE UP (ref 4.8–10.8)
WBC # FLD AUTO: 5.5 K/UL — SIGNIFICANT CHANGE UP (ref 4.8–10.8)

## 2017-10-10 PROCEDURE — 99233 SBSQ HOSP IP/OBS HIGH 50: CPT

## 2017-10-10 RX ORDER — SODIUM CHLORIDE 9 MG/ML
1000 INJECTION INTRAMUSCULAR; INTRAVENOUS; SUBCUTANEOUS
Qty: 0 | Refills: 0 | Status: DISCONTINUED | OUTPATIENT
Start: 2017-10-10 | End: 2017-10-12

## 2017-10-10 RX ORDER — PANTOPRAZOLE SODIUM 20 MG/1
40 TABLET, DELAYED RELEASE ORAL
Qty: 0 | Refills: 0 | Status: DISCONTINUED | OUTPATIENT
Start: 2017-10-10 | End: 2017-10-12

## 2017-10-10 RX ORDER — IRON SUCROSE 20 MG/ML
200 INJECTION, SOLUTION INTRAVENOUS EVERY 24 HOURS
Qty: 0 | Refills: 0 | Status: DISCONTINUED | OUTPATIENT
Start: 2017-10-10 | End: 2017-10-12

## 2017-10-10 RX ADMIN — Medication 4 MILLIGRAM(S): at 14:56

## 2017-10-10 RX ADMIN — IRON SUCROSE 110 MILLIGRAM(S): 20 INJECTION, SOLUTION INTRAVENOUS at 14:58

## 2017-10-10 RX ADMIN — Medication 4 MILLIGRAM(S): at 21:52

## 2017-10-10 RX ADMIN — PANTOPRAZOLE SODIUM 40 MILLIGRAM(S): 20 TABLET, DELAYED RELEASE ORAL at 11:52

## 2017-10-10 RX ADMIN — SODIUM CHLORIDE 75 MILLILITER(S): 9 INJECTION INTRAMUSCULAR; INTRAVENOUS; SUBCUTANEOUS at 16:39

## 2017-10-10 RX ADMIN — PANTOPRAZOLE SODIUM 40 MILLIGRAM(S): 20 TABLET, DELAYED RELEASE ORAL at 06:56

## 2017-10-10 RX ADMIN — PANTOPRAZOLE SODIUM 40 MILLIGRAM(S): 20 TABLET, DELAYED RELEASE ORAL at 17:03

## 2017-10-10 RX ADMIN — Medication 4 MILLIGRAM(S): at 06:56

## 2017-10-10 NOTE — PROGRESS NOTE ADULT - ASSESSMENT
83y Male PMH Metastatic Lung cancer (poorly differentiated adenocarcinoma) with brain metastasis on chemotherapy, HTN, CKD Stage III (Cr ~1.4) referred to ED for metabolic encephalopathy.    > microcytic Anemia-  iron deficient + occult, increasing BUN and decreasing h/h.     trend, venofer, ivf, IV PPI BID.  defer GI evaluation given clinical status    > Metabolic encephalopathy due to brain metastasis from lung ca  - Responding to Decadron. continue for now      DNR, DNI MOLST form fileed, HCP-(Sunil singleton) in agreement      no chemo planned.  s/p xrt    > SYLVIA --suspected dehydration, resolving.    > HTN - Monitor BP. d/c all bp meds.    > Hyponatremia - resolved    > DVT Prophylaxis - Lower extremity intermittent compression devices.

## 2017-10-10 NOTE — PROGRESS NOTE ADULT - SUBJECTIVE AND OBJECTIVE BOX
seen for metastatic lung ca    no acute complaints  ros negative     MEDICATIONS  (STANDING):  dexamethasone  Injectable 4 milliGRAM(s) IV Push every 8 hours  iron sucrose IVPB 200 milliGRAM(s) IV Intermittent every 24 hours  pantoprazole  Injectable 40 milliGRAM(s) IV Push two times a day    MEDICATIONS  (PRN):      Allergies    No Known Allergies    Vital Signs Last 24 Hrs  T(C): 36.4 (10 Oct 2017 11:15), Max: 36.5 (09 Oct 2017 16:10)  T(F): 97.6 (10 Oct 2017 11:15), Max: 97.7 (09 Oct 2017 16:10)  HR: 60 (10 Oct 2017 11:15) (56 - 63)  BP: 90/51 (10 Oct 2017 11:15) (88/56 - 98/57)  BP(mean): --  RR: 18 (10 Oct 2017 11:15) (17 - 18)  SpO2: 94% (10 Oct 2017 11:15) (94% - 95%)    PHYSICAL EXAM:    GENERAL: NAD, cachectic   CHEST/LUNG: Clear to percussion bilaterally  HEART: Regular rate and rhythm; S1 S2  ABDOMEN: Soft, Nontender, Nondistended; Bowel sounds present  EXTREMITIES:  no edema.   NERVOUS SYSTEM:  Alert & awake, responsive. confused. nonfocal neuro exam     LABS:                        7.8    5.5   )-----------( 202      ( 10 Oct 2017 08:06 )             24.0     10-10    137  |  102  |  41.0<H>  ----------------------------<  121<H>  4.4   |  23.0  |  1.25    Ca    7.8<L>      10 Oct 2017 08:06    TPro  6.2<L>  /  Alb  2.7<L>  /  TBili  0.2<L>  /  DBili  x   /  AST  16  /  ALT  24  /  AlkPhos  65  10-10          CAPILLARY BLOOD GLUCOSE            RADIOLOGY & ADDITIONAL TESTS:

## 2017-10-11 LAB
BLD GP AB SCN SERPL QL: SIGNIFICANT CHANGE UP
HCT VFR BLD CALC: 23.5 % — LOW (ref 42–52)
HGB BLD-MCNC: 7.7 G/DL — LOW (ref 14–18)
MCHC RBC-ENTMCNC: 25 PG — LOW (ref 27–31)
MCHC RBC-ENTMCNC: 32.8 G/DL — SIGNIFICANT CHANGE UP (ref 32–36)
MCV RBC AUTO: 76.3 FL — LOW (ref 80–94)
PLATELET # BLD AUTO: 176 K/UL — SIGNIFICANT CHANGE UP (ref 150–400)
RBC # BLD: 3.08 M/UL — LOW (ref 4.6–6.2)
RBC # FLD: 17.7 % — HIGH (ref 11–15.6)
TYPE + AB SCN PNL BLD: SIGNIFICANT CHANGE UP
WBC # BLD: 6 K/UL — SIGNIFICANT CHANGE UP (ref 4.8–10.8)
WBC # FLD AUTO: 6 K/UL — SIGNIFICANT CHANGE UP (ref 4.8–10.8)

## 2017-10-11 PROCEDURE — 99233 SBSQ HOSP IP/OBS HIGH 50: CPT

## 2017-10-11 RX ORDER — LEVETIRACETAM 250 MG/1
500 TABLET, FILM COATED ORAL
Qty: 0 | Refills: 0 | Status: DISCONTINUED | OUTPATIENT
Start: 2017-10-11 | End: 2017-10-12

## 2017-10-11 RX ORDER — DEXAMETHASONE 0.5 MG/5ML
4 ELIXIR ORAL THREE TIMES A DAY
Qty: 0 | Refills: 0 | Status: DISCONTINUED | OUTPATIENT
Start: 2017-10-11 | End: 2017-10-12

## 2017-10-11 RX ADMIN — SODIUM CHLORIDE 75 MILLILITER(S): 9 INJECTION INTRAMUSCULAR; INTRAVENOUS; SUBCUTANEOUS at 21:35

## 2017-10-11 RX ADMIN — Medication 4 MILLIGRAM(S): at 06:22

## 2017-10-11 RX ADMIN — IRON SUCROSE 110 MILLIGRAM(S): 20 INJECTION, SOLUTION INTRAVENOUS at 13:03

## 2017-10-11 RX ADMIN — Medication 4 MILLIGRAM(S): at 17:27

## 2017-10-11 RX ADMIN — LEVETIRACETAM 500 MILLIGRAM(S): 250 TABLET, FILM COATED ORAL at 17:27

## 2017-10-11 RX ADMIN — PANTOPRAZOLE SODIUM 40 MILLIGRAM(S): 20 TABLET, DELAYED RELEASE ORAL at 06:23

## 2017-10-11 RX ADMIN — SODIUM CHLORIDE 75 MILLILITER(S): 9 INJECTION INTRAMUSCULAR; INTRAVENOUS; SUBCUTANEOUS at 06:22

## 2017-10-11 RX ADMIN — PANTOPRAZOLE SODIUM 40 MILLIGRAM(S): 20 TABLET, DELAYED RELEASE ORAL at 17:27

## 2017-10-11 RX ADMIN — Medication 4 MILLIGRAM(S): at 21:35

## 2017-10-11 NOTE — PROGRESS NOTE ADULT - ASSESSMENT
83y Male PMH Metastatic Lung cancer (poorly differentiated adenocarcinoma) with brain metastasis on chemotherapy, HTN, CKD Stage III (Cr ~1.4) referred to ED for metabolic encephalopathy.    > microcytic Anemia-  iron deficient + occult, increasing BUN and decreasing h/h.     trend, venofer, ivf, IV PPI BID.  defer GI evaluation given clinical status     1U PRBC    > Metabolic encephalopathy due to brain metastasis from lung ca  - Responding to Decadron. continue for now      DNR, DNI MOLST form filled HCP-(Sunil singleton) in agreement      no chemo planned.  s/p xrt    > SYLVIA --suspected dehydration, resolving.    > HTN - Monitor BP. d/c all bp meds as hypotensive    > Hyponatremia - resolved    > DVT Prophylaxis - Lower extremity intermittent compression devices.    likely DANTE with initiation of hospice then  PT pending.

## 2017-10-11 NOTE — PROGRESS NOTE ADULT - SUBJECTIVE AND OBJECTIVE BOX
seen for lung ca with brain mets    feels well. no acute complaints wants to go home.  ROS negative    MEDICATIONS  (STANDING):  dexamethasone     Tablet 4 milliGRAM(s) Oral three times a day  iron sucrose IVPB 200 milliGRAM(s) IV Intermittent every 24 hours  levETIRAcetam 500 milliGRAM(s) Oral two times a day  pantoprazole  Injectable 40 milliGRAM(s) IV Push two times a day  sodium chloride 0.9%. 1000 milliLiter(s) (75 mL/Hr) IV Continuous <Continuous>    MEDICATIONS  (PRN):      Allergies    No Known Allergies    Vital Signs Last 24 Hrs  T(C): 36.4 (11 Oct 2017 12:33), Max: 36.4 (11 Oct 2017 05:31)  T(F): 97.6 (11 Oct 2017 12:33), Max: 97.6 (11 Oct 2017 12:33)  HR: 52 (11 Oct 2017 12:33) (52 - 82)  BP: 96/55 (11 Oct 2017 12:33) (94/53 - 98/57)  BP(mean): --  RR: 18 (11 Oct 2017 12:33) (18 - 18)  SpO2: 96% (11 Oct 2017 05:31) (92% - 96%)    PHYSICAL EXAM:    GENERAL: NAD cachectic   CHEST/LUNG: ctab  HEART: Regular rate and rhythm; S1 S2  ABDOMEN: Soft, Nontender, Nondistended; Bowel sounds present  EXTREMITIES:  no edema.   Neuro alert/responsive, nonfocal. improved mental status    LABS:                        7.7    6.0   )-----------( 176      ( 11 Oct 2017 07:19 )             23.5     10-10    137  |  102  |  41.0<H>  ----------------------------<  121<H>  4.4   |  23.0  |  1.25    Ca    7.8<L>      10 Oct 2017 08:06    TPro  6.2<L>  /  Alb  2.7<L>  /  TBili  0.2<L>  /  DBili  x   /  AST  16  /  ALT  24  /  AlkPhos  65  10-10          CAPILLARY BLOOD GLUCOSE            RADIOLOGY & ADDITIONAL TESTS:

## 2017-10-12 ENCOUNTER — TRANSCRIPTION ENCOUNTER (OUTPATIENT)
Age: 82
End: 2017-10-12

## 2017-10-12 VITALS
OXYGEN SATURATION: 94 % | TEMPERATURE: 97 F | DIASTOLIC BLOOD PRESSURE: 53 MMHG | SYSTOLIC BLOOD PRESSURE: 100 MMHG | HEART RATE: 54 BPM | RESPIRATION RATE: 18 BRPM

## 2017-10-12 LAB
HCT VFR BLD CALC: 26.9 % — LOW (ref 42–52)
HGB BLD-MCNC: 8.8 G/DL — LOW (ref 14–18)
MCHC RBC-ENTMCNC: 25.4 PG — LOW (ref 27–31)
MCHC RBC-ENTMCNC: 32.7 G/DL — SIGNIFICANT CHANGE UP (ref 32–36)
MCV RBC AUTO: 77.7 FL — LOW (ref 80–94)
PLATELET # BLD AUTO: 203 K/UL — SIGNIFICANT CHANGE UP (ref 150–400)
RBC # BLD: 3.46 M/UL — LOW (ref 4.6–6.2)
RBC # FLD: 17.7 % — HIGH (ref 11–15.6)
WBC # BLD: 7.2 K/UL — SIGNIFICANT CHANGE UP (ref 4.8–10.8)
WBC # FLD AUTO: 7.2 K/UL — SIGNIFICANT CHANGE UP (ref 4.8–10.8)

## 2017-10-12 PROCEDURE — 85610 PROTHROMBIN TIME: CPT

## 2017-10-12 PROCEDURE — 86901 BLOOD TYPING SEROLOGIC RH(D): CPT

## 2017-10-12 PROCEDURE — 80053 COMPREHEN METABOLIC PANEL: CPT

## 2017-10-12 PROCEDURE — 99239 HOSP IP/OBS DSCHRG MGMT >30: CPT

## 2017-10-12 PROCEDURE — 82728 ASSAY OF FERRITIN: CPT

## 2017-10-12 PROCEDURE — 84466 ASSAY OF TRANSFERRIN: CPT

## 2017-10-12 PROCEDURE — 86850 RBC ANTIBODY SCREEN: CPT

## 2017-10-12 PROCEDURE — 80048 BASIC METABOLIC PNL TOTAL CA: CPT

## 2017-10-12 PROCEDURE — 86920 COMPATIBILITY TEST SPIN: CPT

## 2017-10-12 PROCEDURE — 99285 EMERGENCY DEPT VISIT HI MDM: CPT | Mod: 25

## 2017-10-12 PROCEDURE — 93005 ELECTROCARDIOGRAM TRACING: CPT

## 2017-10-12 PROCEDURE — 86900 BLOOD TYPING SEROLOGIC ABO: CPT

## 2017-10-12 PROCEDURE — 82270 OCCULT BLOOD FECES: CPT

## 2017-10-12 PROCEDURE — 83550 IRON BINDING TEST: CPT

## 2017-10-12 PROCEDURE — 97163 PT EVAL HIGH COMPLEX 45 MIN: CPT

## 2017-10-12 PROCEDURE — 85730 THROMBOPLASTIN TIME PARTIAL: CPT

## 2017-10-12 PROCEDURE — 36415 COLL VENOUS BLD VENIPUNCTURE: CPT

## 2017-10-12 PROCEDURE — 83010 ASSAY OF HAPTOGLOBIN QUANT: CPT

## 2017-10-12 PROCEDURE — 70450 CT HEAD/BRAIN W/O DYE: CPT

## 2017-10-12 PROCEDURE — 85027 COMPLETE CBC AUTOMATED: CPT

## 2017-10-12 PROCEDURE — P9016: CPT

## 2017-10-12 PROCEDURE — 36430 TRANSFUSION BLD/BLD COMPNT: CPT

## 2017-10-12 RX ORDER — FOLIC ACID 0.8 MG
1 TABLET ORAL
Qty: 0 | Refills: 0 | COMMUNITY

## 2017-10-12 RX ORDER — PANTOPRAZOLE SODIUM 20 MG/1
1 TABLET, DELAYED RELEASE ORAL
Qty: 0 | Refills: 0 | COMMUNITY

## 2017-10-12 RX ORDER — LEVETIRACETAM 250 MG/1
1 TABLET, FILM COATED ORAL
Qty: 0 | Refills: 0 | COMMUNITY
Start: 2017-10-12

## 2017-10-12 RX ORDER — DEXAMETHASONE 0.5 MG/5ML
1 ELIXIR ORAL
Qty: 0 | Refills: 0 | COMMUNITY
Start: 2017-10-12

## 2017-10-12 RX ORDER — FERROUS SULFATE 325(65) MG
1 TABLET ORAL
Qty: 0 | Refills: 0 | COMMUNITY

## 2017-10-12 RX ORDER — METOPROLOL TARTRATE 50 MG
1 TABLET ORAL
Qty: 0 | Refills: 0 | COMMUNITY

## 2017-10-12 RX ORDER — OLMESARTAN MEDOXOMIL 5 MG/1
1 TABLET, FILM COATED ORAL
Qty: 0 | Refills: 0 | COMMUNITY

## 2017-10-12 RX ORDER — AMLODIPINE BESYLATE 2.5 MG/1
1 TABLET ORAL
Qty: 0 | Refills: 0 | COMMUNITY

## 2017-10-12 RX ADMIN — PANTOPRAZOLE SODIUM 40 MILLIGRAM(S): 20 TABLET, DELAYED RELEASE ORAL at 17:43

## 2017-10-12 RX ADMIN — SODIUM CHLORIDE 75 MILLILITER(S): 9 INJECTION INTRAMUSCULAR; INTRAVENOUS; SUBCUTANEOUS at 09:13

## 2017-10-12 RX ADMIN — LEVETIRACETAM 500 MILLIGRAM(S): 250 TABLET, FILM COATED ORAL at 06:20

## 2017-10-12 RX ADMIN — Medication 4 MILLIGRAM(S): at 14:16

## 2017-10-12 RX ADMIN — IRON SUCROSE 110 MILLIGRAM(S): 20 INJECTION, SOLUTION INTRAVENOUS at 14:15

## 2017-10-12 RX ADMIN — PANTOPRAZOLE SODIUM 40 MILLIGRAM(S): 20 TABLET, DELAYED RELEASE ORAL at 06:20

## 2017-10-12 RX ADMIN — LEVETIRACETAM 500 MILLIGRAM(S): 250 TABLET, FILM COATED ORAL at 17:44

## 2017-10-12 RX ADMIN — Medication 4 MILLIGRAM(S): at 06:20

## 2017-10-12 NOTE — PHYSICAL THERAPY INITIAL EVALUATION ADULT - PERTINENT HX OF CURRENT PROBLEM, REHAB EVAL
Pt presents to [facility] s/p episode of AMS and confusion. Pt has CA w/ mets to brain.

## 2017-10-12 NOTE — DISCHARGE NOTE ADULT - MEDICATION SUMMARY - MEDICATIONS TO STOP TAKING
I will STOP taking the medications listed below when I get home from the hospital:    folic acid 1 mg oral tablet  -- 1 tab(s) by mouth once a day    Benicar 20 mg oral tablet  -- 1 tab(s) by mouth once a day    metoprolol succinate 50 mg oral tablet, extended release  -- 1 tab(s) by mouth once a day    amLODIPine 10 mg oral tablet  -- 1 tab(s) by mouth once a day

## 2017-10-12 NOTE — PHYSICAL THERAPY INITIAL EVALUATION ADULT - ADDITIONAL COMMENTS
Per patient, he has about 5 steps to get into his apartment. He usually walking with a cane, but owns  RW.

## 2017-10-12 NOTE — DISCHARGE NOTE ADULT - PATIENT PORTAL LINK FT
“You can access the Follow[employer] Patient Portal, offered by [facility], by registering with the following website: [email or web address]”

## 2017-10-12 NOTE — DISCHARGE NOTE ADULT - SECONDARY DIAGNOSIS.
Primary malignant neoplasm of lung metastatic to other site, unspecified laterality Iron deficiency anemia, unspecified iron deficiency anemia type Goals of care, counseling/discussion Hypotension due to drugs

## 2017-10-12 NOTE — CHART NOTE - NSCHARTNOTEFT_GEN_A_CORE
patient seen and examine.d  no acute complaints  wants to go home  AAOX3 NAD RRR s1s2 CTAB soft abdomen. no LE edema  nonfocal neuro.  cachectic, frail    d/c to DANTE .  see dc summary.

## 2017-10-12 NOTE — DISCHARGE NOTE ADULT - PLAN OF CARE
resolved. due to brain metastasis with edema  continue dexamethasone and taper slowly as outpatient  continue keppra for seizure prophylaxis  follow up with hospice as outpatient and oncology if needed. no further treatment per oncology 1 Unit PRBC transfused  continue iron supplements DNR/DNI  outpatient hospice transition all hypertension medications suspected  reinitiate if needed

## 2017-10-12 NOTE — DISCHARGE NOTE ADULT - CARE PLAN
Principal Discharge DX:	Encephalopathy  Goal:	resolved.  Instructions for follow-up, activity and diet:	due to brain metastasis with edema  continue dexamethasone and taper slowly as outpatient  continue keppra for seizure prophylaxis  follow up with hospice as outpatient and oncology if needed.  Secondary Diagnosis:	Primary malignant neoplasm of lung metastatic to other site, unspecified laterality  Instructions for follow-up, activity and diet:	no further treatment per oncology  Secondary Diagnosis:	Iron deficiency anemia, unspecified iron deficiency anemia type  Instructions for follow-up, activity and diet:	1 Unit PRBC transfused  continue iron supplements  Secondary Diagnosis:	Goals of care, counseling/discussion  Instructions for follow-up, activity and diet:	DNR/DNI  outpatient hospice transition  Secondary Diagnosis:	Hypotension due to drugs  Instructions for follow-up, activity and diet:	all hypertension medications suspected  reinitiate if needed

## 2017-10-12 NOTE — DISCHARGE NOTE ADULT - MEDICATION SUMMARY - MEDICATIONS TO TAKE
I will START or STAY ON the medications listed below when I get home from the hospital:    dexamethasone 4 mg oral tablet  -- 1 tab(s) by mouth 2 times a day  -- Indication: For Cerebral edema    levETIRAcetam 500 mg oral tablet  -- 1 tab(s) by mouth 2 times a day  -- Indication: For seizure prophylaxis    ferrous sulfate 325 mg (65 mg elemental iron) oral delayed release tablet  -- 1 tab(s) by mouth 2 times a day  -- Indication: For iron deficiency anemia    Protonix 40 mg oral delayed release tablet  -- 1 tab(s) by mouth 2 times a day  -- Indication: For GI prophylaxis

## 2017-10-12 NOTE — DISCHARGE NOTE ADULT - CARE PROVIDER_API CALL
Willis Braden), Hematology; Internal Medicine; Medical Oncology  [facility]  Dept of Medicine  [address] [address] [place] [address]  Phone: [phone]  Fax: [phone]

## 2017-10-12 NOTE — DISCHARGE NOTE ADULT - HOSPITAL COURSE
83y Male PMH Metastatic Lung cancer (poorly differentiated adenocarcinoma) with brain metastasis on chemotherapy/ whole brain radiation, HTN, CKD Stage III (Cr ~1.4) referred to ED for metabolic encephalopathy.  Patient received dexamethasone with improvement in mental status.  received 1 U PRBC for iron deficiency anemia and likely anemia induced by chemo with improvement in hemoglobin.  no further plans for treatment by oncology and planned for DANET discharge with outpatient hospice transition.  HCP Sunil Saez completed MOLST with DNR/DNI    DANTE discharge.   dc planning 45 min.

## 2017-10-16 ENCOUNTER — OUTPATIENT (OUTPATIENT)
Dept: OUTPATIENT SERVICES | Facility: HOSPITAL | Age: 82
LOS: 1 days | Discharge: HOME | End: 2017-10-16

## 2017-10-16 DIAGNOSIS — C34.92 MALIGNANT NEOPLASM OF UNSPECIFIED PART OF LEFT BRONCHUS OR LUNG: ICD-10-CM

## 2017-10-16 PROBLEM — C34.90 MALIGNANT NEOPLASM OF UNSPECIFIED PART OF UNSPECIFIED BRONCHUS OR LUNG: Chronic | Status: ACTIVE | Noted: 2017-10-06

## 2017-10-20 ENCOUNTER — APPOINTMENT (OUTPATIENT)
Dept: HEMATOLOGY ONCOLOGY | Facility: CLINIC | Age: 82
End: 2017-10-20

## 2017-10-20 ENCOUNTER — APPOINTMENT (OUTPATIENT)
Dept: INFUSION THERAPY | Facility: CLINIC | Age: 82
End: 2017-10-20

## 2017-10-31 ENCOUNTER — APPOINTMENT (OUTPATIENT)
Dept: RADIATION ONCOLOGY | Facility: CLINIC | Age: 82
End: 2017-10-31

## 2017-11-09 ENCOUNTER — APPOINTMENT (OUTPATIENT)
Dept: HEMATOLOGY ONCOLOGY | Facility: CLINIC | Age: 82
End: 2017-11-09

## 2017-11-10 ENCOUNTER — APPOINTMENT (OUTPATIENT)
Dept: INFUSION THERAPY | Facility: CLINIC | Age: 82
End: 2017-11-10

## 2017-11-30 ENCOUNTER — APPOINTMENT (OUTPATIENT)
Dept: HEMATOLOGY ONCOLOGY | Facility: CLINIC | Age: 82
End: 2017-11-30

## 2017-12-01 ENCOUNTER — APPOINTMENT (OUTPATIENT)
Dept: INFUSION THERAPY | Facility: CLINIC | Age: 82
End: 2017-12-01

## 2017-12-21 ENCOUNTER — APPOINTMENT (OUTPATIENT)
Dept: HEMATOLOGY ONCOLOGY | Facility: CLINIC | Age: 82
End: 2017-12-21

## 2017-12-22 ENCOUNTER — APPOINTMENT (OUTPATIENT)
Dept: INFUSION THERAPY | Facility: CLINIC | Age: 82
End: 2017-12-22

## 2018-07-28 PROBLEM — Z86.73 HISTORY OF STROKE: Status: RESOLVED | Noted: 2017-09-07 | Resolved: 2018-07-28

## 2019-05-09 NOTE — ASU PREOP CHECKLIST - WEIGHT IN KG
----- Message from [address] sent at 5/9/2019  8:17 AM EDT -----  Regarding: Jacque Giles: [phone]  Mom called seeking testing results. Please advise [phone]. [phone]

## 2021-09-17 NOTE — DISCHARGE NOTE ADULT - PROVIDER RX CONTACT NUMBER
Spoke to patient and reviewed the results   He was told we will repeat in 1 year      ----- Message from Jesus Willard MD sent at 9/16/2021  2:44 PM EDT -----  Normal results ultrasound of the aorta no significant change in the aneurysm repeat study in 1 year good news [phone]

## 2021-09-29 NOTE — ED ADULT TRIAGE NOTE - MODE OF ARRIVAL
SURGICAL CONSULTATION    DATE OF CONSULT:  10/4/2021     Primary Care Physician:  Alicia Singleton DO  Requesting Provider:  Alicia Singleton DO     Reason for Request:     HISTORY OF PRESENT ILLNESS:   Dickson Rice Jr. is a 28 year old male who presents at the request of Alicia Singleton DO for a soft lump on his right buttock.  Patient states it has been present for about 2 months and has been increasing in size.  It causes him a considerable amount of discomfort when it is touched.  He states he is uncertain if it is erythematous.  Reports drainage but it recently has stopped as of last week.  Denies fevers.  Patient has never had this before and completed a course of Keflex.      Past Medical History:   Diagnosis Date   • Diverticulosis 2015        Past Surgical History:   Procedure Laterality Date   • Colonoscopy  2015       No current outpatient medications on file.     No current facility-administered medications for this visit.        ALLERGIES:  No Known Allergies     Social History     Tobacco Use   • Smoking status: Never Smoker   • Smokeless tobacco: Never Used   Vaping Use   • Vaping Use: never used   Substance Use Topics   • Alcohol use: Yes     Comment: Drinks alcohol on occasion (Once per month)   • Drug use: Never         Family History   Problem Relation Age of Onset   • Crohn's Disease Mother    • Hypertension Mother    • Hypertension Father    • Sleep Apnea Father    • Rheumatoid Arthritis Sister    • Depression Sister          REVIEW OF SYSTEMS:   Constitutional:  Denies weight loss, denies sensitivity to hot or cold, denies skin tumors/lumps, denies sleeping difficulty, denies change in appetite, denies high/low blood sugar, denies fevers or night sweats   HEENT:  Denies frequent headaches, denies dentures, denies deafness, denies ear infection, denies sore throat, denies epistaxis, denies sinus discharge, denies motion sickness, denies eyeglasses, denies neck lumps, denies blurred vision,  denies double vision  Cardiorespiratory:  Denies smoking, denies chronic cough, denies SOB (shortness of breath), denies HTN (hypertension), denies racing heart, denies ankle swelling, denies varicose veins, denies heart murmur, denies pain in legs with exercise, denies DVT (deep vein thrombosis), denies CHF (congestive heart failure), denies palpitations   Gastrointestinal:  Denies nausea/vomiting, denies abdominal pain, denies heartburn, denies hemorrhoids, denies hepatitis, denies rectal pain, denies jaundice, denies constipation, denies history of colon polyps, denies melena/hematochezia, denies diarrhea  Genitourinary:  Denies kidney stones, denies kidney/bladder infections, denies dysuria, denies hematuria, denies difficulty urinating, denies urinary frequency  Musculoskeletal:  Denies aching muscles, denies aching joints, denies back/shoulder pain, denies broken bones  Neurologic:  Denies faintness, denies convulsions, denies numbness, denies nervousness, denies depression, denies paralysis, denies frequent loss of balance, denies crying spells, denies trouble concentrating    Reproductive:     Males:  Denies sores on penis, denies swelling in testicles, denies discharge    from penis, denies prostate disease, denies family history of prostate cancer.    Skin:  Denies new rashes or lesions, denies black moles, Reports nonhealing sores.  Allergic/Immunologic:  Denies recurrent infections or hypersensitivity      PHYSICAL EXAM:  Visit Vitals  BP (!) 134/96 (BP Location: RFA - Right forearm, Patient Position: Sitting, Cuff Size: Regular)   Pulse 98   Temp 97.9 °F (36.6 °C) (Temporal)   Ht 5' 5\" (1.651 m)   Wt 103.4 kg (228 lb)   BMI 37.94 kg/m²     Body mass index is 37.94 kg/m².  General:  Healthy appearing in no acute distress.  Well nourished, well developed, obese male.    HEENT:  Normocephalic, atraumatic.  Sclerae anicteric.  Conjunctivae pink and moist.  Oral and nasal mucosa are pink and moist.  Neck:   Trachea midline.    Extremities:  No clubbing, cyanosis or edema.  Skin:  Warm and dry to inspection and palpation without rashes.   Rectum: At 4:00, 3 cm from anal opening, is a scar which is healed /closed currently. Looks obvious this is where it had drained. No subcutaneous tract is palpated.  Tissues are nontender.  There is no induration.  No erythema    Neurologic:  Gross motor and sensory function is intact; no facial asymmetries.  Psychiatric:  Patient is alert and oriented x3, answering questions appropriately and has normal affect.     Recent Labs   Lab 08/26/21  0714   Glucose 97   Sodium 140   Potassium 3.8   Chloride 105   Carbon Dioxide 26   BUN 8   Creatinine 0.70   Calcium 9.0   Albumin 3.8   GOT/AST 24   Bilirubin, Total 0.2   Alkaline Phosphatase 55   GPT 46   Globulin 4.2*       IMPRESSION AND PLAN:   Dickson Rice Jr. is a 28 year old male with a history of probable perirectal abscess, resolved/ improved with antibiotic therapy. I do not palpate any fistula tract.     Using a book with pictures, we discussed the nature and natural history of perirectal abscesses and fistulas. Reviewed that I do not find a fistula tract at this time. Surgical intervention is unnecessary today.     I also discussed with patient the possibility of recurrence and that if that happens an excision could be needed. Dickson was instructed to contact our office with signs of recurrence.     The patient voiced understanding and all questions were answered to his satisfaction. He can return to our office as needed.       On 10/4/2021, Marlena CANTU scribed the services personally performed by Blake Zamora MD.     The documentation recorded by the scribe accurately and completely reflects the service(s) I personally performed and the decisions made by me.  This note has been reviewed and edited by myself.    Blake Zamora MD             Private Vehicle

## 2021-10-28 NOTE — H&P PST ADULT - MUSCULOSKELETAL
What Type Of Note Output Would You Prefer (Optional)?: Bullet Format How Severe Is Your Skin Lesion?: mild Has Your Skin Lesion Been Treated?: not been treated Is This A New Presentation, Or A Follow-Up?: Growth details… detailed exam no joint swelling/no calf tenderness

## 2023-11-09 NOTE — ASU DISCHARGE PLAN (ADULT/PEDIATRIC). - MEDICATION SUMMARY - MEDICATIONS TO CHANGE
Fax received from [facility] requesting Drs sig on OT script. Last [facility] w/a different practice 5/23/23. Forms placed on [facility] desk at [facility]. Routed to [facility] as FYI. Pt has upcoming hunter on 11/27/23 w/[facility] in [place]. I will SWITCH the dose or number of times a day I take the medications listed below when I get home from the hospital:  None
